# Patient Record
Sex: MALE | Race: WHITE | NOT HISPANIC OR LATINO | URBAN - METROPOLITAN AREA
[De-identification: names, ages, dates, MRNs, and addresses within clinical notes are randomized per-mention and may not be internally consistent; named-entity substitution may affect disease eponyms.]

---

## 2017-05-06 ENCOUNTER — HOSPITAL ENCOUNTER (EMERGENCY)
Facility: HOSPITAL | Age: 52
Discharge: HOME | End: 2017-05-07
Attending: EMERGENCY MEDICINE

## 2017-05-06 VITALS
BODY MASS INDEX: 26.66 KG/M2 | OXYGEN SATURATION: 96 % | SYSTOLIC BLOOD PRESSURE: 122 MMHG | HEIGHT: 69 IN | TEMPERATURE: 99 F | HEART RATE: 121 BPM | RESPIRATION RATE: 20 BRPM | WEIGHT: 180 LBS | DIASTOLIC BLOOD PRESSURE: 61 MMHG

## 2017-05-06 DIAGNOSIS — Z23 NEED FOR TETANUS BOOSTER: ICD-10-CM

## 2017-05-06 DIAGNOSIS — Z87.828: ICD-10-CM

## 2017-05-06 DIAGNOSIS — H11.33 SUBCONJUNCTIVAL HEMORRHAGE OF BOTH EYES: ICD-10-CM

## 2017-05-06 DIAGNOSIS — S09.90XA HEAD INJURY, INITIAL ENCOUNTER: ICD-10-CM
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
## 2017-05-06 DIAGNOSIS — S01.411A: Primary | ICD-10-CM

## 2017-05-06 PROCEDURE — 99283 EMERGENCY DEPT VISIT LOW MDM: CPT

## 2017-05-06 RX ORDER — LIDOCAINE HYDROCHLORIDE 10 MG/ML
10 INJECTION INFILTRATION; PERINEURAL
Status: DISCONTINUED | OUTPATIENT
Start: 2017-05-07 | End: 2017-05-07 | Stop reason: HOSPADM
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
## 2017-05-07 NOTE — ED NOTES
"Pt refusing all medical treatment and wants to leave. States he understands his conditions and "I will take care of it myself."  "
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
## 2017-05-07 NOTE — DISCHARGE INSTRUCTIONS
Black Eye     Wrap a thin towel around a cold pack before applying it to your eye.     A black eye is really a bruise around your eye. It is often caused by an injury to your face or head. It is not usually due to an injury to the eye itself. The swelling and black-and-blue color happen because of blood and fluids collecting in the skin around your eye. A black eye should return to normal in 1 or 2 weeks.  When to go to the emergency room (ER)  In many cases, a black eye is a minor injury. It can be treated with cold packs and pain medicine. But seek medical care right away if you have any of these symptoms:  · A change or loss of vision  · An eye that won't look in more than one direction  · Blood inside your eye, or bleeding from your nose or ears  · Fluid leaking from your eye  What to expect in the ER  · Your injury will be examined.  · Your vision, the way your eye moves, and the bones around your eye will be checked.  · You may have a fluorescein stain test. This uses dye and a special light to check for eye damage.  · An X-ray or other tests may be done.  · Depending on the results of your exam and tests, you may be referred to an eye specialist (ophthalmologist).  Follow-up  While your eye is healing, call your healthcare provider if you notice any of these symptoms:  · Swelling that doesn't improve after a few days  · Increased or severe pain  · Changes in your vision  · Warmth, redness, or pus near the bruise  To reduce pain and swelling from a black eye  · Apply ice packs every 20 minutes you're awake for the first 24 hours.  · Use warm compresses every 20 minutes for the next 24 hours.   Date Last Reviewed: 8/12/2015  © 4404-6985 [employer]. [address] [place] [address], [place] [address]. All rights reserved. This information is not intended as a substitute for professional medical care. Always follow your healthcare professional's instructions.          Face Laceration: Suture or  Tape  A laceration is a cut through the skin. This will require stitches if it is deep. Minor cuts may be treated with surgical tape.    Home care  · Your healthcare provider may prescribe an antibiotic. This is to help prevent infection. Follow all instructions for taking this medicine. Take the medicine every day until it is gone or you are told to stop. You should not have any left over.  · The healthcare provider may prescribe medicines for pain. Follow instructions for taking them.  · Follow the healthcare providers instructions on how to care for the cut.  · Wash your hands with soap and warm water before and after caring for the cut. This helps prevent infection.  · If a bandage was applied and it becomes wet or dirty, replace it. Otherwise, leave it in place for the first 24 hours, then change it once a day or as directed.  · If sutures were used, clean the wound daily:  ¨ After removing the bandage, wash the area with soap and water. Use a wet cotton swab to loosen and remove any blood or crust that forms.  ¨ After cleaning, keep the wound clean and dry. Talk with your doctor before applying any antibiotic ointment to the wound. Reapply a fresh bandage.  ¨ You may remove the bandage to shower as usual after the first 24 hours, but do not soak the area in water (no swimming) until the sutures are removed.  · If surgical tape was used, keep the area clean and dry. If it becomes wet, blot it dry with a towel.  · Most facial skin wounds heal without problems. However, an infection sometimes occurs despite proper treatment. Therefore, watch for the signs of infection listed below.  Follow-up care  Follow up with your healthcare provider as advised. Be sure to return for suture removal as directed. Ask your provider how long sutures should remain in place. If surgical tape closures were used, you may remove them yourself when your provider recommends if they have not fallen off on their own.  When to seek  medical advice  Call your healthcare provider right away if any of these occur:  · Wound bleeding not controlled by direct pressure  · Signs of infection, including increasing pain in the wound, increasing wound redness or swelling, or pus or bad odor coming from the wound  · Fever of 100.4°F (38ºC) or higher or as directed by your healthcare provider  · Stitches come apart or fall out or surgical tape falls off before 5 days  · Wound edges re-open  · Wound changes colors  · Numbness around the wound   Date Last Reviewed: 6/14/2015 © 2000-2016 [employer]. [address] [place] [address]. All rights reserved. This information is not intended as a substitute for professional medical care. Always follow your healthcare professional's instructions.          Treatment for Mild Traumatic Brain Injury (Concussion)  A mild traumatic brain injury (TBI) is a sudden jolt to your head that causes a temporary change in the way your brain works. It could be caused by a blow to your head, a blast, or a sudden and severe movement of your head that bounces your brain inside your skull. Falls, fights, sports, and car accidents also can cause TBIs.  Treatment of mild TBI   Having a mild TBI can change the way you feel, act, move, and think. Even though you may look fine, a mild TBI can have a big impact on many areas of your life. A mild TBI can cause headaches, fatigue, memory problems, mood swings, and inability to focus your thoughts.  Treatment for mild TBI may be different, depending on symptoms and other unrelated medical issues; therefore, no 2 TBIs are the same. You may need to work with a TBI team -- a group of healthcare providers who help people recover from TBI. For example, you might work with a physical therapist to help with your balance and movement problems. Or you might work with an occupational therapist to help you function better at home and at work. Other medical experts may help you  with emotional and thinking problems.  In some cases, your doctor may use medicine to ease symptoms while you recover. These may include pain relievers, antidepressants, antianxiety medicine, sleep aids, and muscle relaxants. Although medicines can help, they are not a main part of treatment. You should not take any medicines unless discussed and approved by your healthcare provider. Things that you can do for yourself are usually as important as the medicines you are prescribed. This part of your treatment is called self-management.  Self-management for mild TBI  Most people with mild TBI recover completely, but it may take weeks or months. For some people, symptoms may continue for years. Because of this, self-management may continue long after you leave the hospital. Many lifestyle changes that help your brain recover are good habits that you should keep up even after you have recovered. Here are some tips:    · Learn as much as you can about TBI. Share what you learn with friends and family.  · Let your health care team know about all your symptoms.  · Eat a healthy diet and drink plenty of fluids.  · Get plenty of sleep.  · Dont overexert yourself mentally or physically.  · Dont smoke, take drugs, or drink alcohol.  · Dont use caffeine or energy drinks as a way to make you feel less tired.  · Avoid activities that could cause another jolt to your head. Don't return to sports or any activity that could cause you to hit your head until all symptoms are gone and you have been cleared by your doctor. A second head injury before fully recovering from the first one can lead to serious brain injury. Ask your health care provider what types of activities are safe.  · Avoid mental stress. Learn some relaxation techniques like deep breathing.  · Keep a pad and pencil handy. Write things down if you are having trouble concentrating or remembering.  Let your healthcare provider know if your symptoms are getting worse.  And look out for other important mental symptoms. These include memory loss, having a hard time thinking clearly, having trouble controlling your emotions, and depression. Also be sure to report physical symptoms such as worsening headaches, loss of balance, changes in vision, seizures, and vomiting.  Recovery from a mild TBI takes time. Be patient and give your brain time to heal. Be sure to rely on your support system, which includes friends and family members who understand what you are going through. You might also want to join a support group and share your feelings with others who have had a TBI.    Date Last Reviewed: 8/17/2015  © 1722-8519 [employer]. [address] [place] [address], [place] [address]. All rights reserved. This information is not intended as a substitute for professional medical care. Always follow your healthcare professional's instructions.

## 2024-03-07 ENCOUNTER — HOSPITAL ENCOUNTER (OUTPATIENT)
Dept: GENERAL RADIOLOGY | Facility: HOSPITAL | Age: 59
Discharge: HOME | End: 2024-03-07
Payer: COMMERCIAL

## 2024-03-07 ENCOUNTER — OFFICE VISIT (OUTPATIENT)
Dept: INTERNAL MEDICINE | Facility: CLINIC | Age: 59
End: 2024-03-07
Payer: COMMERCIAL

## 2024-03-07 VITALS
OXYGEN SATURATION: 95 % | HEIGHT: 71 IN | HEART RATE: 88 BPM | BODY MASS INDEX: 44.1 KG/M2 | TEMPERATURE: 98 F | WEIGHT: 315 LBS | SYSTOLIC BLOOD PRESSURE: 152 MMHG | RESPIRATION RATE: 16 BRPM | DIASTOLIC BLOOD PRESSURE: 90 MMHG

## 2024-03-07 DIAGNOSIS — M25.561 CHRONIC PAIN OF BOTH KNEES: ICD-10-CM

## 2024-03-07 DIAGNOSIS — G89.29 CHRONIC PAIN OF BOTH KNEES: ICD-10-CM

## 2024-03-07 DIAGNOSIS — M25.562 CHRONIC PAIN OF BOTH KNEES: ICD-10-CM

## 2024-03-07 DIAGNOSIS — M17.0 PRIMARY OSTEOARTHRITIS OF BOTH KNEES: ICD-10-CM

## 2024-03-07 DIAGNOSIS — R06.02 SHORTNESS OF BREATH: ICD-10-CM

## 2024-03-07 DIAGNOSIS — I10 PRIMARY HYPERTENSION: Primary | ICD-10-CM

## 2024-03-07 DIAGNOSIS — K21.9 GASTROESOPHAGEAL REFLUX DISEASE WITHOUT ESOPHAGITIS: ICD-10-CM

## 2024-03-07 DIAGNOSIS — E29.1 HYPOGONADISM IN MALE: ICD-10-CM

## 2024-03-07 DIAGNOSIS — E66.01 MORBID OBESITY DUE TO EXCESS CALORIES: ICD-10-CM

## 2024-03-07 DIAGNOSIS — E55.9 VITAMIN D DEFICIENCY: ICD-10-CM

## 2024-03-07 DIAGNOSIS — Z87.898 HISTORY OF ALCOHOL USE DISORDER: ICD-10-CM

## 2024-03-07 DIAGNOSIS — Z12.2 SCREENING FOR LUNG CANCER: ICD-10-CM

## 2024-03-07 DIAGNOSIS — F17.201 TOBACCO DEPENDENCE IN REMISSION: ICD-10-CM

## 2024-03-07 PROCEDURE — 80053 COMPREHEN METABOLIC PANEL: CPT | Performed by: INTERNAL MEDICINE

## 2024-03-07 PROCEDURE — 84439 ASSAY OF FREE THYROXINE: CPT | Performed by: INTERNAL MEDICINE

## 2024-03-07 PROCEDURE — 73560 X-RAY EXAM OF KNEE 1 OR 2: CPT

## 2024-03-07 PROCEDURE — 84481 FREE ASSAY (FT-3): CPT | Performed by: INTERNAL MEDICINE

## 2024-03-07 PROCEDURE — 82306 VITAMIN D 25 HYDROXY: CPT | Performed by: INTERNAL MEDICINE

## 2024-03-07 PROCEDURE — 82607 VITAMIN B-12: CPT | Performed by: INTERNAL MEDICINE

## 2024-03-07 PROCEDURE — 83735 ASSAY OF MAGNESIUM: CPT | Performed by: INTERNAL MEDICINE

## 2024-03-07 PROCEDURE — 82746 ASSAY OF FOLIC ACID SERUM: CPT | Performed by: INTERNAL MEDICINE

## 2024-03-07 PROCEDURE — 85025 COMPLETE CBC W/AUTO DIFF WBC: CPT | Performed by: INTERNAL MEDICINE

## 2024-03-07 PROCEDURE — 80061 LIPID PANEL: CPT | Performed by: INTERNAL MEDICINE

## 2024-03-07 PROCEDURE — 71046 X-RAY EXAM CHEST 2 VIEWS: CPT

## 2024-03-07 PROCEDURE — 84443 ASSAY THYROID STIM HORMONE: CPT | Performed by: INTERNAL MEDICINE

## 2024-03-07 RX ORDER — TRIAMTERENE AND HYDROCHLOROTHIAZIDE 37.5; 25 MG/1; MG/1
1 TABLET ORAL DAILY
Qty: 30 TABLET | Refills: 2 | Status: SHIPPED | OUTPATIENT
Start: 2024-03-07

## 2024-03-07 RX ORDER — TESTOSTERONE CYPIONATE 200 MG/ML
VIAL (ML) INTRAMUSCULAR
COMMUNITY
Start: 2024-01-04

## 2024-03-07 RX ORDER — ESCITALOPRAM OXALATE 20 MG/1
TABLET ORAL
COMMUNITY

## 2024-03-07 RX ORDER — TRAZODONE HYDROCHLORIDE 100 MG/1
TABLET ORAL
COMMUNITY

## 2024-03-07 RX ORDER — TIOTROPIUM BROMIDE INHALATION SPRAY 3.12 UG/1
2 SPRAY, METERED RESPIRATORY (INHALATION)
Qty: 4 G | Refills: 2 | Status: SHIPPED | OUTPATIENT
Start: 2024-03-07

## 2024-03-07 NOTE — PATIENT INSTRUCTIONS
Do labs today   Xrays of knees and CXR  Do pfts  And low dose Ct chest  Refer to Ortho-Dr Tracy-for further evaluation of knee pain  Refer to endocrinologist for evaluation of hypogonadism and continuation of meds  Cut down portions  Monitor BP 2 x/day - 3-4 x/week and record goal is blood pressure less than 130/80  Start triamterene hydrochlorothiazide 1 tablet daily for blood pressure control  Follow-up in 3 weeks to check blood pressure and labs

## 2024-03-07 NOTE — ASSESSMENT & PLAN NOTE
Probable COPD versus other pathology-needs further evaluation wife is still smoking patient stopped smoking 9 years ago and used to smoke 1/2 to 1 pack/day for 20 years.    Plan do routine chest x-ray and needs low-dose CT of the chest to rule out lung cancer  Patient given sample of Symbicort Respimat 2 puffs daily and prescription for this  Also needs PFTs

## 2024-03-07 NOTE — ASSESSMENT & PLAN NOTE
Continue with PPI once daily advised to cut down on chronic use of ibuprofen-already using diclofenac gel as needed  Ortho referral initiated  Steroid shots did not help in the past  Check labs CBC kidney function

## 2024-03-07 NOTE — PROGRESS NOTES
CHIEF COMPLAINT  Kevin Jackson presents to [facility] to Bilateral knee pain (Right knee is worse. Asking for an injection. ) and Shortness of Breath (Steadily getting worse. Cough-white, thick mucous. Nose drainage-no color. No sore throat. No headache. )     HPI  58-year-old patient here to establish care-previous PCP was Dr Luevano- in [facility].    Main complaint is shortness of breath is getting worse-295 lbs to 300 lbs past year    On testosterone shots for several years-5-6 years    Knee pain worse- was getting knee injections every 3 months- for past year--using ibuprofen 200 mg x 12 tablets if very painful-usually takes 800 mg daily of ibuprofen-rates pain at 5-6/10    Past medical history significant for GERD, anxiety, tobacco dependence, obesity    SH--1/2-1PPD  for 20 yrs-quit 9 yrs ago-wife still smoking-  used to be a big beer drinker 7-8 cans beer 10 months ago for 15 yrs-now 3-4 beers every 2 months   for 20 yrs and [employer] prior-retired in 2022      Past History:  Allergies: Patient has no known allergies.   Medical History: has a past medical history of GERD (gastroesophageal reflux disease).   Surgical History: has no past surgical history on file.   Family History: family history is not on file.   Social History: reports that he quit smoking about 9 years ago. His smoking use included cigarettes. He started smoking about 28 years ago. He has a 9.9 pack-year smoking history. He has never used smokeless tobacco.  Social History     Social History Narrative    Not on file         Current Outpatient Medications:     escitalopram (LEXAPRO) 20 MG tablet, , Disp: , Rfl:     pantoprazole (PROTONIX) 40 mg in 100mL NS IVPB, , Disp: , Rfl:     Testosterone Cypionate 200 MG/ML solution, 1 ML Intramuscular [id number] for 30 days, Disp: , Rfl:     traZODone (DESYREL) 100 MG tablet, , Disp: , Rfl:     tiotropium bromide monohydrate (Spiriva  "Respimat) 2.5 MCG/ACT aerosol solution inhaler, Inhale 2 puffs Daily., Disp: 4 g, Rfl: 2    triamterene-hydrochlorothiazide (Maxzide-25) 37.5-25 MG per tablet, Take 1 tablet by mouth Daily., Disp: 30 tablet, Rfl: 2     OBJECTIVE  Vital Signs  Vitals:    03/07/24 1305 03/07/24 1349   BP: (!) 170/110 152/90   BP Location: Left arm Left arm   Patient Position: Sitting Sitting   Cuff Size: Large Adult Large Adult   Pulse: 88    Resp: 16    Temp: 98 °F (36.7 °C)    TempSrc: Temporal    SpO2: 95%    Weight: (!) 144 kg (318 lb)    Height: 180.3 cm (71\")       Body mass index is 44.35 kg/m².      Physical Exam  Vitals and nursing note reviewed.   Constitutional:       Appearance: Normal appearance. He is obese.   HENT:      Head: Normocephalic and atraumatic.      Nose: Nose normal.   Eyes:      Extraocular Movements: Extraocular movements intact.      Pupils: Pupils are equal, round, and reactive to light.   Cardiovascular:      Rate and Rhythm: Normal rate and regular rhythm.   Pulmonary:      Effort: Pulmonary effort is normal.      Breath sounds: Normal breath sounds.   Abdominal:      General: Abdomen is flat.      Palpations: Abdomen is soft.   Musculoskeletal:      Cervical back: Normal range of motion and neck supple.   Skin:     General: Skin is warm and dry.   Neurological:      General: No focal deficit present.      Mental Status: He is alert and oriented to person, place, and time.   Psychiatric:         Mood and Affect: Mood normal.         Behavior: Behavior normal.         RESULTS REVIEW  No results found for: \"PROBNP\", \"BNP\"          No results found for: \"TSH\"   No results found for: \"FREET4\"         No Images in the past 120 days found..              ASSESSMENT & PLAN  Diagnoses and all orders for this visit:    1. Primary hypertension (Primary)  Assessment & Plan:  Not controlled patient has had weight gain of more than 9 pounds since last fall 2023 and also with worsening knee pain  Blood pressure at " clinic initially was 170/110 and on my exam down to 152/90    Plan discussed hypertension and complications patient is not on any medications-discussed that this is a silent killer trying to avoid strokes heart attack peripheral vascular disease and kidney problems among others.  Start Maxide 37 point 5-25 1 tablet daily  Follow a low-salt diet  Cut back on portions    Orders:  -     Comprehensive Metabolic Panel; Future  -     Lipid Panel; Future  -     TSH+Free T4; Future  -     T3, Free; Future  -     Vitamin B12; Future  -     Folate; Future  -     Magnesium; Future  -     Vitamin D,25-Hydroxy; Future  -     CBC & Differential; Future  -     MicroAlbumin, Urine, Random - Urine, Clean Catch; Future  -     triamterene-hydrochlorothiazide (Maxzide-25) 37.5-25 MG per tablet; Take 1 tablet by mouth Daily.  Dispense: 30 tablet; Refill: 2  -     CBC & Differential  -     Vitamin D,25-Hydroxy  -     Magnesium  -     Folate  -     Vitamin B12  -     T3, Free  -     TSH+Free T4  -     Lipid Panel  -     Comprehensive Metabolic Panel    2. Shortness of breath  Comments:  probable COPD-do CXR-inhaler -PFTS-wife still smoking  Assessment & Plan:  Probable COPD versus other pathology-needs further evaluation wife is still smoking patient stopped smoking 9 years ago and used to smoke 1/2 to 1 pack/day for 20 years.    Plan do routine chest x-ray and needs low-dose CT of the chest to rule out lung cancer  Patient given sample of Symbicort Respimat 2 puffs daily and prescription for this  Also needs PFTs    Orders:  -     XR Chest PA & Lateral; Future  -     tiotropium bromide monohydrate (Spiriva Respimat) 2.5 MCG/ACT aerosol solution inhaler; Inhale 2 puffs Daily.  Dispense: 4 g; Refill: 2  -     Cancel: Complete PFT - Pre & Post Bronchodilator; Future  -     Complete PFT - Pre & Post Bronchodilator; Future    3. Chronic pain of both knees  -     Comprehensive Metabolic Panel; Future  -     Lipid Panel; Future  -     TSH+Free  T4; Future  -     T3, Free; Future  -     Vitamin B12; Future  -     Folate; Future  -     Magnesium; Future  -     Vitamin D,25-Hydroxy; Future  -     CBC & Differential; Future  -     MicroAlbumin, Urine, Random - Urine, Clean Catch; Future  -     XR Knee 3 View Right; Future  -     XR Knee 1 or 2 View Left; Future  -     Ambulatory Referral to Orthopedic Surgery  -     CBC & Differential  -     Vitamin D,25-Hydroxy  -     Magnesium  -     Folate  -     Vitamin B12  -     T3, Free  -     TSH+Free T4  -     Lipid Panel  -     Comprehensive Metabolic Panel    4. Primary osteoarthritis of both knees  -     Ambulatory Referral to Orthopedic Surgery    5. Hypogonadism in male  Comments:  getting testosterone snots every every 2 weeks for past 5-6 yrs-by provider Dr Luevano  Overview:  getting testosterone snots every every 2 weeks for past 5-6 yrs-by provider Dr Luevano    Orders:  -     Ambulatory Referral to Endocrinology    6. Gastroesophageal reflux disease without esophagitis  Assessment & Plan:  Continue with PPI once daily advised to cut down on chronic use of ibuprofen-already using diclofenac gel as needed  Ortho referral initiated  Steroid shots did not help in the past  Check labs CBC kidney function    Orders:  -     Comprehensive Metabolic Panel; Future  -     Lipid Panel; Future  -     TSH+Free T4; Future  -     T3, Free; Future  -     Vitamin B12; Future  -     Folate; Future  -     Magnesium; Future  -     Vitamin D,25-Hydroxy; Future  -     CBC & Differential; Future  -     MicroAlbumin, Urine, Random - Urine, Clean Catch; Future  -     CBC & Differential  -     Vitamin D,25-Hydroxy  -     Magnesium  -     Folate  -     Vitamin B12  -     T3, Free  -     TSH+Free T4  -     Lipid Panel  -     Comprehensive Metabolic Panel    7. Vitamin D deficiency  -     Comprehensive Metabolic Panel; Future  -     Lipid Panel; Future  -     TSH+Free T4; Future  -     T3, Free; Future  -     Vitamin B12; Future  -      Folate; Future  -     Magnesium; Future  -     Vitamin D,25-Hydroxy; Future  -     CBC & Differential; Future  -     MicroAlbumin, Urine, Random - Urine, Clean Catch; Future  -     CBC & Differential  -     Vitamin D,25-Hydroxy  -     Magnesium  -     Folate  -     Vitamin B12  -     T3, Free  -     TSH+Free T4  -     Lipid Panel  -     Comprehensive Metabolic Panel    8. Tobacco dependence in remission  -      CT Chest Low Dose Cancer Screening WO; Future  -     tiotropium bromide monohydrate (Spiriva Respimat) 2.5 MCG/ACT aerosol solution inhaler; Inhale 2 puffs Daily.  Dispense: 4 g; Refill: 2  -     Cancel: Complete PFT - Pre & Post Bronchodilator; Future  -     Complete PFT - Pre & Post Bronchodilator; Future    9. Screening for lung cancer  -      CT Chest Low Dose Cancer Screening WO; Future    10. Morbid obesity due to excess calories  -     Comprehensive Metabolic Panel; Future  -     Lipid Panel; Future  -     TSH+Free T4; Future  -     T3, Free; Future  -     Vitamin B12; Future  -     Folate; Future  -     Magnesium; Future  -     Vitamin D,25-Hydroxy; Future  -     CBC & Differential; Future  -     MicroAlbumin, Urine, Random - Urine, Clean Catch; Future  -     Ambulatory Referral to Orthopedic Surgery  -     Cancel: Complete PFT - Pre & Post Bronchodilator; Future  -     CBC & Differential  -     Vitamin D,25-Hydroxy  -     Magnesium  -     Folate  -     Vitamin B12  -     T3, Free  -     TSH+Free T4  -     Lipid Panel  -     Comprehensive Metabolic Panel  -     Complete PFT - Pre & Post Bronchodilator; Future    11. History of alcohol use disorder  Comments:  used to be a big beer drinker 7-8 cans beer for 15 yrs  10 months ago-in 2022--now 3-4 beers every 2 months         BMI cannot be calculated due to outdated height or weight values.  Please input a current height/weight in Vitals and re-renter [id number] in Note to pull in correct documentation based on BMI range.       Patient Instructions   Do  labs today   Xrays of knees and CXR  Do pfts  And low dose Ct chest  Refer to Ortho-Dr Tracy-for further evaluation of knee pain  Refer to endocrinologist for evaluation of hypogonadism and continuation of meds  Cut down portions  Monitor BP 2 x/day - 3-4 x/week and record goal is blood pressure less than 130/80  Start triamterene hydrochlorothiazide 1 tablet daily for blood pressure control  Follow-up in 3 weeks to check blood pressure and labs       FOLLOW UP  Return in about 3 weeks (around 3/28/2024), or if symptoms worsen or fail to improve, for Annual physical, Recheck.    Patient was given instructions and counseling regarding his condition or for health maintenance advice. Please see specific information pulled into the AVS if appropriate.

## 2024-03-07 NOTE — ASSESSMENT & PLAN NOTE
Not controlled patient has had weight gain of more than 9 pounds since last fall 2023 and also with worsening knee pain  Blood pressure at clinic initially was 170/110 and on my exam down to 152/90    Plan discussed hypertension and complications patient is not on any medications-discussed that this is a silent killer trying to avoid strokes heart attack peripheral vascular disease and kidney problems among others.  Start Maxide 37 point 5-25 1 tablet daily  Follow a low-salt diet  Cut back on portions

## 2024-03-08 ENCOUNTER — TELEPHONE (OUTPATIENT)
Dept: INTERNAL MEDICINE | Facility: CLINIC | Age: 59
End: 2024-03-08
Payer: COMMERCIAL

## 2024-03-08 LAB
25(OH)D3 SERPL-MCNC: 35.5 NG/ML (ref 30–100)
ALBUMIN SERPL-MCNC: 4.3 G/DL (ref 3.5–5.2)
ALBUMIN/GLOB SERPL: 1.5 G/DL
ALP SERPL-CCNC: 66 U/L (ref 39–117)
ALT SERPL W P-5'-P-CCNC: 25 U/L (ref 1–41)
ANION GAP SERPL CALCULATED.3IONS-SCNC: 12.5 MMOL/L (ref 5–15)
AST SERPL-CCNC: 26 U/L (ref 1–40)
BASOPHILS # BLD AUTO: 0.06 10*3/MM3 (ref 0–0.2)
BASOPHILS NFR BLD AUTO: 0.7 % (ref 0–1.5)
BILIRUB SERPL-MCNC: 0.5 MG/DL (ref 0–1.2)
BUN SERPL-MCNC: 12 MG/DL (ref 6–20)
BUN/CREAT SERPL: 11.9 (ref 7–25)
CALCIUM SPEC-SCNC: 9.1 MG/DL (ref 8.6–10.5)
CHLORIDE SERPL-SCNC: 101 MMOL/L (ref 98–107)
CHOLEST SERPL-MCNC: 212 MG/DL (ref 0–200)
CO2 SERPL-SCNC: 22.5 MMOL/L (ref 22–29)
CREAT SERPL-MCNC: 1.01 MG/DL (ref 0.76–1.27)
DEPRECATED RDW RBC AUTO: 40.7 FL (ref 37–54)
EGFRCR SERPLBLD CKD-EPI 2021: 87.3 ML/MIN/1.73
EOSINOPHIL # BLD AUTO: 0.1 10*3/MM3 (ref 0–0.4)
EOSINOPHIL NFR BLD AUTO: 1.2 % (ref 0.3–6.2)
ERYTHROCYTE [DISTWIDTH] IN BLOOD BY AUTOMATED COUNT: 13.4 % (ref 12.3–15.4)
FOLATE SERPL-MCNC: 10.4 NG/ML (ref 4.78–24.2)
GLOBULIN UR ELPH-MCNC: 2.9 GM/DL
GLUCOSE SERPL-MCNC: 83 MG/DL (ref 65–99)
HCT VFR BLD AUTO: 47 % (ref 37.5–51)
HDLC SERPL-MCNC: 35 MG/DL (ref 40–60)
HGB BLD-MCNC: 15.3 G/DL (ref 13–17.7)
IMM GRANULOCYTES # BLD AUTO: 0.04 10*3/MM3 (ref 0–0.05)
IMM GRANULOCYTES NFR BLD AUTO: 0.5 % (ref 0–0.5)
LDLC SERPL CALC-MCNC: 155 MG/DL (ref 0–100)
LDLC/HDLC SERPL: 4.35 {RATIO}
LYMPHOCYTES # BLD AUTO: 1.66 10*3/MM3 (ref 0.7–3.1)
LYMPHOCYTES NFR BLD AUTO: 20.1 % (ref 19.6–45.3)
MAGNESIUM SERPL-MCNC: 2 MG/DL (ref 1.6–2.6)
MCH RBC QN AUTO: 27.5 PG (ref 26.6–33)
MCHC RBC AUTO-ENTMCNC: 32.6 G/DL (ref 31.5–35.7)
MCV RBC AUTO: 84.4 FL (ref 79–97)
MONOCYTES # BLD AUTO: 0.5 10*3/MM3 (ref 0.1–0.9)
MONOCYTES NFR BLD AUTO: 6.1 % (ref 5–12)
NEUTROPHILS NFR BLD AUTO: 5.89 10*3/MM3 (ref 1.7–7)
NEUTROPHILS NFR BLD AUTO: 71.4 % (ref 42.7–76)
NRBC BLD AUTO-RTO: 0 /100 WBC (ref 0–0.2)
PLATELET # BLD AUTO: 222 10*3/MM3 (ref 140–450)
PMV BLD AUTO: 10.8 FL (ref 6–12)
POTASSIUM SERPL-SCNC: 3.8 MMOL/L (ref 3.5–5.2)
PROT SERPL-MCNC: 7.2 G/DL (ref 6–8.5)
RBC # BLD AUTO: 5.57 10*6/MM3 (ref 4.14–5.8)
SODIUM SERPL-SCNC: 136 MMOL/L (ref 136–145)
T3FREE SERPL-MCNC: 3.59 PG/ML (ref 2–4.4)
T4 FREE SERPL-MCNC: 1.01 NG/DL (ref 0.93–1.7)
TRIGL SERPL-MCNC: 123 MG/DL (ref 0–150)
TSH SERPL DL<=0.05 MIU/L-ACNC: 2.09 UIU/ML (ref 0.27–4.2)
VIT B12 BLD-MCNC: 864 PG/ML (ref 211–946)
VLDLC SERPL-MCNC: 22 MG/DL (ref 5–40)
WBC NRBC COR # BLD AUTO: 8.25 10*3/MM3 (ref 3.4–10.8)

## 2024-03-08 NOTE — TELEPHONE ENCOUNTER
----- Message from Karen Rios MD sent at 3/7/2024  4:29 PM EST -----  Please call the patient regarding his chest x-ray.  Please inform patient that everything was normal no mass or other abnormalities of concern

## 2024-03-08 NOTE — TELEPHONE ENCOUNTER
Name: Kevin Jackson    Relationship: Self    Best Callback Number: [phone]    HUB PROVIDED THE RELAY MESSAGE FROM THE OFFICE   PATIENT VOICED UNDERSTANDING AND HAS NO FURTHER QUESTIONS AT THIS TIME

## 2024-03-08 NOTE — TELEPHONE ENCOUNTER
Called, and no one answered, left a voicemail for them to call the office back.     Hub relay: Please inform patient that everything was normal no mass or other abnormalities of concern

## 2024-03-19 ENCOUNTER — OFFICE VISIT (OUTPATIENT)
Dept: ORTHOPEDIC SURGERY | Facility: CLINIC | Age: 59
End: 2024-03-19
Payer: COMMERCIAL

## 2024-03-19 VITALS
SYSTOLIC BLOOD PRESSURE: 167 MMHG | HEIGHT: 71 IN | HEART RATE: 71 BPM | WEIGHT: 315 LBS | OXYGEN SATURATION: 97 % | DIASTOLIC BLOOD PRESSURE: 91 MMHG | BODY MASS INDEX: 44.1 KG/M2

## 2024-03-19 DIAGNOSIS — E66.01 OBESITY, MORBID, BMI 40.0-49.9: ICD-10-CM

## 2024-03-19 DIAGNOSIS — M17.0 BILATERAL PRIMARY OSTEOARTHRITIS OF KNEE: Primary | ICD-10-CM

## 2024-03-19 PROCEDURE — 99203 OFFICE O/P NEW LOW 30 MIN: CPT | Performed by: ORTHOPAEDIC SURGERY

## 2024-03-19 RX ORDER — IBUPROFEN 400 MG/1
400 TABLET ORAL EVERY 6 HOURS PRN
COMMUNITY

## 2024-03-19 NOTE — PROGRESS NOTES
"Chief Complaint  Initial Evaluation of the Left Knee and Initial Evaluation of the Right Knee     Subjective      Kevin Jackson presents to [facility] for evaluation of the bilateral knees. The patient reports bilateral knee pain. He denies a specific injury. He reports his right is worse than the left. He reports pain for a couple years. He is retired. He admits to pain and stiffness. He has had steroid injections by his PCP that no longer give him relief. He uses Voltaren gel and takes ibuprofen.     No Known Allergies     Social History     Socioeconomic History    Marital status:    Tobacco Use    Smoking status: Former     Current packs/day: 0.00     Average packs/day: 0.5 packs/day for 19.8 years (9.9 ttl pk-yrs)     Types: Cigarettes     Start date: 1995     Quit date: 3/1/2015     Years since quittin.0    Smokeless tobacco: Never        I reviewed the patient's chief complaint, history of present illness, review of systems, past medical history, surgical history, family history, social history, medications, and allergy list.     Review of Systems     Constitutional: Denies fevers, chills, weight loss  Cardiovascular: Denies chest pain, shortness of breath  Skin: Denies rashes, acute skin changes  Neurologic: Denies headache, loss of consciousness  MSK: bilateral knee pain      Vital Signs:   /91   Pulse 71   Ht 180.3 cm (71\")   Wt (!) 144 kg (318 lb)   SpO2 97%   BMI 44.35 kg/m²          Physical Exam  General: Alert. No acute distress    Ortho Exam      Right knee- Positive EHL, FHL, GS and TA. Sensation intact to all 5 nerves of the foot. Positive pulses. Non-tender. ROM -3 to 120 degrees. Stable to varus/valgus stress. Stable to anterior/posterior drawer. No effusion. Mild swelling. Negative Lachman's. Negative Canelo's. Positive crepitus.     Left knee- Positive EHL, FHL, GS and TA. Sensation intact to all 5 nerves of the foot. Positive pulses. " Non-tender. ROM 0 to 120 degrees. Stable to varus/valgus stress. Stable to anterior/posterior drawer. No effusion. Mild swelling. Negative Lachman's. Negative Canelo's. Positive crepitus.    Procedures      Imaging Results (Most Recent)       None             Result Review :       XR Knee 1 or 2 View Left    Result Date: 3/7/2024  Narrative: PROCEDURE: XR KNEE 1 OR 2 VW LEFT  COMPARISON: None  INDICATIONS: knee pain  FINDINGS: There is no evidence for displaced fracture or dislocation. No definitive joint effusion is observed.  Moderate tricompartmental osteoarthritic degenerative changes are observed. These findings are most pronounced within the medial compartment. No focal osseous abnormalities are seen. The soft tissues are unremarkable.      Impression:  No evidence for displaced fracture or dislocation.  Moderate tricompartmental osteoarthritic degenerative changes of the knee.      ENDER BETANCUR MD       Electronically Signed and Approved By: ENDER BETANCUR MD on 3/07/2024 at 16:40             XR Knee 1 or 2 View Right    Result Date: 3/7/2024  Narrative: PROCEDURE: XR KNEE 1 OR 2 VW RIGHT  COMPARISON: None  INDICATIONS: knee pain  FINDINGS: There is no evidence for displaced fracture or dislocation. No definitive joint effusion is observed.  Moderate tricompartmental osteoarthritic degenerative changes are observed. These findings are most pronounced within the patellofemoral compartment.  A curvilinear density is noted in the region of the anterior tibial tuberosity.  This finding may relate to postoperative changes.  A retained foreign body could have this appearance.  The soft tissues are unremarkable.      Impression:  No evidence for displaced fracture or dislocation.  Moderate tricompartmental osteoarthritic degenerative changes of the knee.      ENDER BETANCUR MD       Electronically Signed and Approved By: ENDER BETANCUR MD on 3/07/2024 at 16:39             XR Chest PA & Lateral    Result Date:  3/7/2024  Narrative: PROCEDURE: XR CHEST PA AND LATERAL  COMPARISON: None  INDICATIONS: dyspnea/tobacco use  FINDINGS:  Heart size and pulmonary vasculature within normal limits.  Hilar and mediastinal contours normal.  Lungs clear other than calcified granuloma on the right.  Costophrenic angles sharp      Impression:  No active cardiopulmonary disease     ANUJ CARRILLO MD       Electronically Signed and Approved By: ANUJ CARRILLO MD on 3/07/2024 at 15:44                     Assessment and Plan     Diagnoses and all orders for this visit:    1. Bilateral primary osteoarthritis of knee (Primary)    2. Obesity, morbid, BMI 40.0-49.9        Discussed the treatment plan with the patient.  I reviewed the previous x-rays with the patient. Plan for conservative treatment at this time. Home exercises reviewed. He has failed to get relief with steroid injections and medications. He has modified his activity. Plan to seek approval for Visco injections.     Educated on risk of elevated BMI.  Discussed options for weight loss/decreasing BMI prior to procedure including dietician consult, weight loss options and exercise program. and Call or return if worsening symptoms.    Follow Up     For Visco injection      Patient was given instructions and counseling regarding his condition or for health maintenance advice. Please see specific information pulled into the AVS if appropriate.     Scribed for Ruperto Barrios MD by Ann Varma.  03/19/24   08:51 EDT    I have personally performed the services described in this document as scribed by the above individual and it is both accurate and complete. Ruperto Barrios MD 03/19/24

## 2024-03-22 ENCOUNTER — PATIENT ROUNDING (BHMG ONLY) (OUTPATIENT)
Dept: INTERNAL MEDICINE | Age: 59
End: 2024-03-22
Payer: COMMERCIAL

## 2024-03-28 ENCOUNTER — OFFICE VISIT (OUTPATIENT)
Dept: INTERNAL MEDICINE | Age: 59
End: 2024-03-28
Payer: COMMERCIAL

## 2024-03-28 VITALS
DIASTOLIC BLOOD PRESSURE: 98 MMHG | BODY MASS INDEX: 44.1 KG/M2 | TEMPERATURE: 98.6 F | HEIGHT: 71 IN | WEIGHT: 315 LBS | SYSTOLIC BLOOD PRESSURE: 150 MMHG | HEART RATE: 68 BPM | OXYGEN SATURATION: 95 %

## 2024-03-28 DIAGNOSIS — E55.9 VITAMIN D DEFICIENCY: ICD-10-CM

## 2024-03-28 DIAGNOSIS — Z00.00 ROUTINE HISTORY AND PHYSICAL EXAMINATION OF ADULT: Primary | ICD-10-CM

## 2024-03-28 DIAGNOSIS — I10 PRIMARY HYPERTENSION: ICD-10-CM

## 2024-03-28 DIAGNOSIS — M17.0 PRIMARY OSTEOARTHRITIS OF BOTH KNEES: ICD-10-CM

## 2024-03-28 DIAGNOSIS — M25.521 PAIN OF BOTH ELBOWS: ICD-10-CM

## 2024-03-28 DIAGNOSIS — F17.201 TOBACCO DEPENDENCE IN REMISSION: ICD-10-CM

## 2024-03-28 DIAGNOSIS — E29.1 HYPOGONADISM IN MALE: ICD-10-CM

## 2024-03-28 DIAGNOSIS — R14.0 BLOATING: ICD-10-CM

## 2024-03-28 DIAGNOSIS — M25.522 PAIN OF BOTH ELBOWS: ICD-10-CM

## 2024-03-28 DIAGNOSIS — Z12.11 SCREEN FOR COLON CANCER: ICD-10-CM

## 2024-03-28 DIAGNOSIS — E66.01 MORBID OBESITY WITH BMI OF 40.0-44.9, ADULT: ICD-10-CM

## 2024-03-28 DIAGNOSIS — E78.00 PURE HYPERCHOLESTEROLEMIA: ICD-10-CM

## 2024-03-28 RX ORDER — AMLODIPINE BESYLATE 5 MG/1
5 TABLET ORAL DAILY
Qty: 30 TABLET | Refills: 2 | Status: SHIPPED | OUTPATIENT
Start: 2024-03-28

## 2024-03-28 RX ORDER — ATORVASTATIN CALCIUM 20 MG/1
20 TABLET, FILM COATED ORAL DAILY
Qty: 30 TABLET | Refills: 2 | Status: SHIPPED | OUTPATIENT
Start: 2024-03-28

## 2024-03-28 RX ORDER — SEMAGLUTIDE 0.5 MG/.5ML
0.5 INJECTION, SOLUTION SUBCUTANEOUS WEEKLY
Qty: 2 ML | Refills: 1 | Status: SHIPPED | OUTPATIENT
Start: 2024-04-28

## 2024-03-28 RX ORDER — SIMETHICONE 80 MG
80 TABLET,CHEWABLE ORAL EVERY 6 HOURS PRN
Qty: 90 TABLET | Refills: 0 | Status: SHIPPED | OUTPATIENT
Start: 2024-03-28

## 2024-03-28 RX ORDER — SEMAGLUTIDE 0.25 MG/.5ML
0.25 INJECTION, SOLUTION SUBCUTANEOUS WEEKLY
Qty: 2 ML | Refills: 0 | Status: SHIPPED | OUTPATIENT
Start: 2024-03-28

## 2024-03-28 NOTE — PROGRESS NOTES
CHIEF COMPLAINT  Kevin Jackson presents to [facility] for follow-up of Annual Exam (Pt is a 58 yr old male presenting to Internal Medicine for an annual physical; Pt reports an increase in joint pain in elbows; pt states he's noticed after sitting for a while, his joints stiffen up/) and Heartburn (Pt reports he's currently taking medication therapy for heartburn, gas, and swelling but he still has the full, tight, gassy feeling in abdomin after a small meal, pt reports discomfort is so bad it gets hard to breath at times after meals).    HPI    59 yo pt here for Physical and f/u of BP, referrals, labs    Records reviewed, meds reviewed in detail, labs reviewed with patient.  Plan of care is as follows below.    Main concern elbow pain -arthritic- mild relief with ibuprofen and feels swollen up after eating-for past 6 months    Chol- BMZ=422, HDL=35, sldu=779    HTN-taking meds-elevated-BP at home 149-150/90s    Hypogonadism-on chronic shots    Knee xrays-mod OA-saw Ortho Dr Barrios 3/19/24-for Visco injections  Pain is at-3-4/10    CXR-NAD     Patient Instructions 3/7/24   Do labs today   Xrays of knees and CXR  Do pfts  And low dose Ct chest  Refer to Ortho-Dr Tracy-for further evaluation of knee pain  Refer to endocrinologist for evaluation of hypogonadism and continuation of meds  Cut down portions  Monitor BP 2 x/day - 3-4 x/week and record goal is blood pressure less than 130/80  Start triamterene hydrochlorothiazide 1 tablet daily for blood pressure control  Follow-up in 3 weeks to check blood pressure and labs    3/7/24 visit  patient here to establish care-previous PCP was Dr Luevano- in [facility].     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          Current Outpatient Medications:     escitalopram (LEXAPRO) 20 MG tablet, , Disp: , Rfl:     ibuprofen (ADVIL,MOTRIN) 400 MG tablet, Take 1 tablet by mouth Every 6 (Six) Hours As Needed for Mild Pain., Disp: , Rfl:     pantoprazole (PROTONIX) 40 mg in 100mL NS IVPB, , Disp: , Rfl:     Testosterone Cypionate 200 MG/ML solution, 1 ML Intramuscular [id number] for 30 days, Disp: , Rfl:     tiotropium bromide monohydrate (Spiriva Respimat) 2.5 MCG/ACT aerosol solution inhaler, Inhale 2 puffs Daily., Disp: 4 g, Rfl: 2    traZODone (DESYREL) 100 MG tablet, , Disp: , Rfl:     triamterene-hydrochlorothiazide (Maxzide-25) 37.5-25 MG per tablet, Take 1 tablet by mouth Daily., Disp: 30 tablet, Rfl: 2    amLODIPine (NORVASC) 5 MG tablet, Take 1 tablet by mouth Daily., Disp: 30 tablet, Rfl: 2    atorvastatin (LIPITOR) 20 MG tablet, Take 1 tablet by mouth Daily., Disp: 30 tablet, Rfl: 2    Semaglutide-Weight Management (Wegovy) 0.25 MG/0.5ML solution auto-injector, Inject 0.5 mL under the skin into the appropriate area as directed 1 (One) Time Per Week. For 4 weeks, Disp: 2 mL, Rfl: 0    [START ON 4/28/2024] Semaglutide-Weight Management (Wegovy) 0.5 MG/0.5ML solution auto-injector, Inject 0.5 mL under the skin into the appropriate area as directed 1 (One) Time Per Week. For 4 weeks, Disp: 2 mL, Rfl: 1    simethicone (Gas-X) 80 MG chewable tablet, Chew 1 tablet Every 6 (Six) Hours As Needed for Flatulence., Disp: 90 tablet, Rfl: 0   PFS reviewed.      OBJECTIVE  Vital Signs  Vitals:    03/28/24 0828 03/28/24 0907 03/28/24 0939   BP: 157/95  "168/93 150/98   BP Location: Right arm Right arm Left arm   Patient Position: Sitting Sitting Sitting   Cuff Size: Large Adult Large Adult Large Adult   Pulse: 68     Temp: 98.6 °F (37 °C)     TempSrc: Infrared     SpO2: 95%     Weight: (!) 144 kg (318 lb)     Height: 180.3 cm (70.98\")        Body mass index is 44.37 kg/m².295 one yr ago IBW-200 lbs    Physical Exam  Vitals and nursing note reviewed.   Constitutional:       Appearance: Normal appearance.   HENT:      Head: Normocephalic and atraumatic.      Nose: Nose normal.   Eyes:      Extraocular Movements: Extraocular movements intact.      Pupils: Pupils are equal, round, and reactive to light.   Cardiovascular:      Rate and Rhythm: Normal rate and regular rhythm.   Pulmonary:      Effort: Pulmonary effort is normal.      Breath sounds: Normal breath sounds.   Abdominal:      General: Abdomen is flat.      Palpations: Abdomen is soft.   Musculoskeletal:      Cervical back: Normal range of motion and neck supple.   Skin:     General: Skin is warm and dry.   Neurological:      General: No focal deficit present.      Mental Status: He is alert and oriented to person, place, and time.   Psychiatric:         Mood and Affect: Mood normal.         Behavior: Behavior normal.          RESULTS REVIEW  No results found for: \"PROBNP\", \"BNP\"  CMP          3/7/2024    14:21   CMP   Glucose 83    BUN 12    Creatinine 1.01    EGFR 87.3    Sodium 136    Potassium 3.8    Chloride 101    Calcium 9.1    Total Protein 7.2    Albumin 4.3    Globulin 2.9    Total Bilirubin 0.5    Alkaline Phosphatase 66    AST (SGOT) 26    ALT (SGPT) 25    Albumin/Globulin Ratio 1.5    BUN/Creatinine Ratio 11.9    Anion Gap 12.5      CBC w/diff          3/7/2024    14:21   CBC w/Diff   WBC 8.25    RBC 5.57    Hemoglobin 15.3    Hematocrit 47.0    MCV 84.4    MCH 27.5    MCHC 32.6    RDW 13.4    Platelets 222    Neutrophil Rel % 71.4    Immature Granulocyte Rel % 0.5    Lymphocyte Rel % 20.1  "   Monocyte Rel % 6.1    Eosinophil Rel % 1.2    Basophil Rel % 0.7       Lipid Panel          3/7/2024    14:21   Lipid Panel   Total Cholesterol 212    Triglycerides 123    HDL Cholesterol 35    VLDL Cholesterol 22    LDL Cholesterol  155    LDL/HDL Ratio 4.35       Lab Results   Component Value Date    TSH 2.090 03/07/2024      Lab Results   Component Value Date    FREET4 1.01 03/07/2024         Lab Results   Component Value Date    RXMTICZF74 864 03/07/2024    LMTM98YO 35.5 03/07/2024    MG 2.0 03/07/2024        XR Knee 1 or 2 View Left    Result Date: 3/7/2024   No evidence for displaced fracture or dislocation.  Moderate tricompartmental osteoarthritic degenerative changes of the knee.      ENDER BETANCUR MD       Electronically Signed and Approved By: ENDER BETANCUR MD on 3/07/2024 at 16:40             XR Knee 1 or 2 View Right    Result Date: 3/7/2024   No evidence for displaced fracture or dislocation.  Moderate tricompartmental osteoarthritic degenerative changes of the knee.      ENDER BETANCUR MD       Electronically Signed and Approved By: ENDER BETANCUR MD on 3/07/2024 at 16:39             XR Chest PA & Lateral    Result Date: 3/7/2024   No active cardiopulmonary disease     ANUJ CARRILLO MD       Electronically Signed and Approved By: ANUJ CARRILLO MD on 3/07/2024 at 15:44                        ASSESSMENT & PLAN  Diagnoses and all orders for this visit:    1. Routine history and physical examination of adult (Primary)  Assessment & Plan:  Ages 40 to 64 Counseling/Anticipatory Guidance Discussed: nutrition, physical activity, healthy weight, injury prevention, misuse of tobacco, alcohol and drugs, dental health, mental health, immunizations, screenings, and use of seatbelts.    Orders:  -     atorvastatin (LIPITOR) 20 MG tablet; Take 1 tablet by mouth Daily.  Dispense: 30 tablet; Refill: 2  -     amLODIPine (NORVASC) 5 MG tablet; Take 1 tablet by mouth Daily.  Dispense: 30 tablet; Refill: 2    2.  Primary hypertension  Assessment & Plan:  Not controlled   Blood pressure at clinic dputiechh=807/98    Plan -add amlodipine 5 mg 1 daily continue with Maxide 37.5-25 1 tablet daily  Follow a low-salt diet-  Cut back on portions    Orders:  -     amLODIPine (NORVASC) 5 MG tablet; Take 1 tablet by mouth Daily.  Dispense: 30 tablet; Refill: 2  -     simethicone (Gas-X) 80 MG chewable tablet; Chew 1 tablet Every 6 (Six) Hours As Needed for Flatulence.  Dispense: 90 tablet; Refill: 0  -     Cologuard - Stool, Per Rectum; Future  -     Semaglutide-Weight Management (Wegovy) 0.25 MG/0.5ML solution auto-injector; Inject 0.5 mL under the skin into the appropriate area as directed 1 (One) Time Per Week. For 4 weeks  Dispense: 2 mL; Refill: 0  -     Semaglutide-Weight Management (Wegovy) 0.5 MG/0.5ML solution auto-injector; Inject 0.5 mL under the skin into the appropriate area as directed 1 (One) Time Per Week. For 4 weeks  Dispense: 2 mL; Refill: 1    3. Hypogonadism in male  Overview:  getting testosterone snots every every 2 weeks for past 5-6 yrs-by provider Dr Luevano    Orders:  -     Ambulatory Referral to Urology    4. Primary osteoarthritis of both knees  Assessment & Plan:  xrays-mod OA-saw Ortho Dr Barrios 3/19/24-for Visco injections  Pain is at-3-4/10      5. Morbid obesity with BMI of 40.0-44.9, adult  Comments:  Trying to follow better diet and increasing activity-discussed GLP-1 agonists-trying to cut back on portions  Orders:  -     simethicone (Gas-X) 80 MG chewable tablet; Chew 1 tablet Every 6 (Six) Hours As Needed for Flatulence.  Dispense: 90 tablet; Refill: 0  -     Cologuard - Stool, Per Rectum; Future  -     Semaglutide-Weight Management (Wegovy) 0.25 MG/0.5ML solution auto-injector; Inject 0.5 mL under the skin into the appropriate area as directed 1 (One) Time Per Week. For 4 weeks  Dispense: 2 mL; Refill: 0  -     Semaglutide-Weight Management (Wegovy) 0.5 MG/0.5ML solution auto-injector;  Inject 0.5 mL under the skin into the appropriate area as directed 1 (One) Time Per Week. For 4 weeks  Dispense: 2 mL; Refill: 1    6. Vitamin D deficiency    7. Tobacco dependence in remission    8. Pain of both elbows  Comments:  probable arthritis-no epicodylitic pain  Overview:  probable arthritis-no epicodylitic pain      9. Pure hypercholesterolemia  -     atorvastatin (LIPITOR) 20 MG tablet; Take 1 tablet by mouth Daily.  Dispense: 30 tablet; Refill: 2    10. Bloating  -     simethicone (Gas-X) 80 MG chewable tablet; Chew 1 tablet Every 6 (Six) Hours As Needed for Flatulence.  Dispense: 90 tablet; Refill: 0    11. Screen for colon cancer  -     Cologuard - Stool, Per Rectum; Future         Class 3 Severe Obesity (BMI >=40). Obesity-related health conditions include the following: hypertension, dyslipidemias, and osteoarthritis. Obesity is unchanged. BMI is is above average; BMI management plan is completed. We discussed low calorie, low carb based diet program, portion control, increasing exercise, joining a fitness center or start home based exercise program, and Weight Watchers or other Commercial based weight reduction program.       Patient Instructions   Start amlodipine 5 mg one daily  Cont with maxzide one daily  Simethincone prn   Start wegovy    Refer toDr Igal -Urology  F/u 2 months to check BP /chol/wegovy   Check BP 2x/d 3-4 x /week -goal BP <130/80    FOLLOW UP  Return in about 2 months (around 5/28/2024) for Recheck. Of BP    Patient was given instructions and counseling regarding his condition or for health maintenance advice. Please see specific information pulled into the AVS if appropriate.

## 2024-03-28 NOTE — PATIENT INSTRUCTIONS
Start amlodipine 5 mg one daily  Cont with maxzide one daily  Simethincone prn   Start wegovy    Refer toDr Igal -Urology  F/u 2 months to check BP /chol/wegovy

## 2024-03-28 NOTE — ASSESSMENT & PLAN NOTE
Ages 40 to 64 Counseling/Anticipatory Guidance Discussed: nutrition, physical activity, healthy weight, injury prevention, misuse of tobacco, alcohol and drugs, dental health, mental health, immunizations, screenings, and use of seatbelts.

## 2024-03-29 NOTE — ASSESSMENT & PLAN NOTE
Not controlled   Blood pressure at clinic vubywlusk=765/98    Plan -add amlodipine 5 mg 1 daily continue with Maxide 37.5-25 1 tablet daily  Follow a low-salt diet-  Cut back on portions

## 2024-04-01 ENCOUNTER — TELEPHONE (OUTPATIENT)
Dept: INTERNAL MEDICINE | Age: 59
End: 2024-04-01
Payer: COMMERCIAL

## 2024-04-01 DIAGNOSIS — E29.1 HYPOGONADISM IN MALE: Primary | ICD-10-CM

## 2024-04-03 ENCOUNTER — CLINICAL SUPPORT (OUTPATIENT)
Dept: INTERNAL MEDICINE | Age: 59
End: 2024-04-03
Payer: COMMERCIAL

## 2024-04-03 DIAGNOSIS — E29.1 HYPOGONADISM IN MALE: ICD-10-CM

## 2024-04-03 PROCEDURE — 84402 ASSAY OF FREE TESTOSTERONE: CPT | Performed by: INTERNAL MEDICINE

## 2024-04-03 PROCEDURE — 84403 ASSAY OF TOTAL TESTOSTERONE: CPT | Performed by: INTERNAL MEDICINE

## 2024-04-03 PROCEDURE — 36415 COLL VENOUS BLD VENIPUNCTURE: CPT | Performed by: INTERNAL MEDICINE

## 2024-04-04 ENCOUNTER — TELEPHONE (OUTPATIENT)
Dept: ORTHOPEDIC SURGERY | Facility: CLINIC | Age: 59
End: 2024-04-04
Payer: COMMERCIAL

## 2024-04-04 NOTE — TELEPHONE ENCOUNTER
SENT MSG THRU MY CHART:  4-4- 24    YOUR APPT WILL BE ON:   DATE:   4-25-24  TIME:   1:00 PM  REASON:  HAWA KNEE EUFLEXXA #1 INJECTION  PROVIDER:  BR[facility]  ADDRESS:  [address] [place] [address]    DATE:   5-2-24  TIME:   9:45 AM  REASON:  HAWA KNEE EUFLEXXA #2 INJECTION  PROVIDER:  [facility]  ADDRESS:  [address] [place] [address]    DATE:   5-9-24  TIME:   9:45 AM  REASON:  HAWA KNEE EUFLEXXA #3 INJECTION  PROVIDER:  [facility]  ADDRESS:  [address] [place] [address]    ANY RECENT INJECTION ON BOTH KNEES?    [employer]    IF YOU HAVE ANY QUESTIONS REGARDING YOUR APPOINTMENTS, PLEASE CALL US [phone].  THANKS

## 2024-04-04 NOTE — TELEPHONE ENCOUNTER
----- Message from Katharina Bojorquez sent at 3/26/2024  4:30 PM EDT -----  No PA required for bilateral knee Euflexxa injection. Per Marielos lozano to schedule. Please call and schedule with Dr. Barrios or Jose F. Thanks

## 2024-04-07 LAB
TESTOST FREE SERPL-MCNC: 11.9 PG/ML (ref 7.2–24)
TESTOST SERPL-MCNC: 712 NG/DL (ref 264–916)

## 2024-04-18 ENCOUNTER — OFFICE VISIT (OUTPATIENT)
Dept: UROLOGY | Facility: CLINIC | Age: 59
End: 2024-04-18
Payer: COMMERCIAL

## 2024-04-18 VITALS
HEART RATE: 79 BPM | HEIGHT: 71 IN | SYSTOLIC BLOOD PRESSURE: 138 MMHG | BODY MASS INDEX: 43.76 KG/M2 | WEIGHT: 312.6 LBS | DIASTOLIC BLOOD PRESSURE: 76 MMHG

## 2024-04-18 DIAGNOSIS — Z79.890 LONG-TERM CURRENT USE OF TESTOSTERONE REPLACEMENT THERAPY: ICD-10-CM

## 2024-04-18 DIAGNOSIS — N40.0 BENIGN PROSTATIC HYPERPLASIA WITHOUT LOWER URINARY TRACT SYMPTOMS: ICD-10-CM

## 2024-04-18 DIAGNOSIS — R79.89 LOW TESTOSTERONE: Primary | ICD-10-CM

## 2024-04-18 LAB
BILIRUB BLD-MCNC: NEGATIVE MG/DL
CLARITY, POC: CLEAR
COLOR UR: ABNORMAL
EXPIRATION DATE: ABNORMAL
GLUCOSE UR STRIP-MCNC: NEGATIVE MG/DL
KETONES UR QL: ABNORMAL
LEUKOCYTE EST, POC: NEGATIVE
Lab: ABNORMAL
NITRITE UR-MCNC: NEGATIVE MG/ML
PH UR: 7 [PH] (ref 5–8)
PROT UR STRIP-MCNC: NEGATIVE MG/DL
RBC # UR STRIP: NEGATIVE /UL
SP GR UR: 1.02 (ref 1–1.03)
UROBILINOGEN UR QL: ABNORMAL

## 2024-04-18 RX ORDER — SILDENAFIL 100 MG/1
TABLET, FILM COATED ORAL
COMMUNITY
End: 2024-04-18

## 2024-04-18 RX ORDER — TESTOSTERONE CYPIONATE 200 MG/ML
200 VIAL (ML) INTRAMUSCULAR
Qty: 6 ML | Refills: 3 | Status: SHIPPED | OUTPATIENT
Start: 2024-04-18 | End: 2024-10-15

## 2024-04-18 NOTE — PROGRESS NOTES
"Testosterone replacement therapy Chief Complaint  Hypogonadism (Pt has been taking testosterone for 5yrs his last injections has been a couple weeks ago pt is trying to establish continued care for the injections)    Subjective  No acute distress        Kevin Jackson presents to [facility]  History of Present Illness    59-year-old white male has been on testosterone replacement therapy for last 5 years getting 200 mg IM every 14 days.  It is helping his energy and also libido.  After 1 week of the injection patient does feel draggy and request to increase the duration of testosterone.  His testosterone in the blood was 712 about 2 weeks ago but that was done day after the injection.  His CBC and CMP was within acceptable limits.    Patient has been urinating without difficulties.  No family history of prostate cancer.  No dysuria or gross hematuria.    Objective no acute distress  Vital Signs:   /76 (BP Location: Left arm, Patient Position: Sitting, Cuff Size: Large Adult)   Pulse 79   Ht 180.3 cm (70.98\")   Wt (!) 142 kg (312 lb 9.6 oz)   BMI 43.62 kg/m²     No Known Allergies   Past medical history:  has a past medical history of GERD (gastroesophageal reflux disease).   Past surgical history:  has no past surgical history on file.  Personal history: Family history is unknown by patient.  Social history:  reports that he quit smoking about 9 years ago. His smoking use included cigarettes. He started smoking about 29 years ago. He has a 9.9 pack-year smoking history. He has never used smokeless tobacco.    Review of Systems    Please see past medical and surgical history    Physical Exam  Constitutional:       General: He is not in acute distress.     Appearance: Normal appearance. He is obese. He is not ill-appearing or toxic-appearing.   HENT:      Head: Normocephalic and atraumatic.      Ears:      Comments: No loss of hearing  Cardiovascular:      Rate and Rhythm: Normal " rate and regular rhythm.      Pulses: Normal pulses.      Heart sounds: Normal heart sounds. No murmur heard.  Pulmonary:      Effort: Pulmonary effort is normal. No respiratory distress.      Breath sounds: Normal breath sounds. No rhonchi or rales.   Abdominal:      Palpations: Abdomen is soft.      Tenderness: There is no abdominal tenderness. There is no right CVA tenderness or left CVA tenderness.      Hernia: A hernia is present.      Comments: Large umbilical hernia which does not bother him   Genitourinary:     Comments: Normal circumcised penis.    Right and left scrotum is normal.    Right left testicle and epididymis is normal.    ANTONIA.  Prostate gland is just about 25 g and benign  Musculoskeletal:         General: No swelling. Normal range of motion.      Cervical back: Normal range of motion and neck supple. No rigidity or tenderness.   Lymphadenopathy:      Cervical: No cervical adenopathy.   Skin:     General: Skin is warm.      Coloration: Skin is not jaundiced.   Neurological:      General: No focal deficit present.      Mental Status: He is alert and oriented to person, place, and time.      Motor: No weakness.      Gait: Gait normal.   Psychiatric:         Mood and Affect: Mood normal.         Behavior: Behavior normal.         Thought Content: Thought content normal.         Judgment: Judgment normal.        Result Review :                 Assessment and Plan    Diagnoses and all orders for this visit:    1. Low testosterone (Primary)  -     POC Urinalysis Dipstick, Automated    2. Long-term current use of testosterone replacement therapy    I will start the patient on testosterone 200 mg IM every 10 days and recheck him in 2-1/2 months after doing the blood test in 2 months time.  Patient is agreeable he and he knows the side effects of testosterone therapy which were discussedI have discussed the side effects of testosterone with the patient.  Patient can have borderline hypertension which can  give rise to cardiac issues.  Patient can have erythrocytosis and elevated erythropoietin and may have to donate blood depending upon the CBC.  Testosterone perse he has no relationship with inducing prostate cancer but if patient develop prostate cancer it will spread the tumor and its growth.  Patient can have thrombophlebitis of lower extremities and even pulmonary embolus.  Patient can also have sleep apnea and testosterone will exaggerate the situation.  In some trials patient has developed chronic kidney disease with testosterone therapy.     Brief Urine Lab Results  (Last result in the past 365 days)        Color   Clarity   Blood   Leuk Est   Nitrite   Protein   CREAT   Urine HCG        04/18/24 1005 Moraima   Clear   Negative   Negative   Negative   Negative                    Follow Up   No follow-ups on file.  Patient was given instructions and counseling regarding his condition or for health maintenance advice. Please see specific information pulled into the AVS if appropriate.     Antonette Montes MD

## 2024-04-19 ENCOUNTER — PATIENT ROUNDING (BHMG ONLY) (OUTPATIENT)
Dept: UROLOGY | Facility: CLINIC | Age: 59
End: 2024-04-19
Payer: COMMERCIAL

## 2024-04-25 ENCOUNTER — TELEPHONE (OUTPATIENT)
Dept: INTERNAL MEDICINE | Age: 59
End: 2024-04-25
Payer: COMMERCIAL

## 2024-04-25 ENCOUNTER — OFFICE VISIT (OUTPATIENT)
Dept: ORTHOPEDIC SURGERY | Facility: CLINIC | Age: 59
End: 2024-04-25
Payer: COMMERCIAL

## 2024-04-25 ENCOUNTER — HOSPITAL ENCOUNTER (OUTPATIENT)
Dept: RESPIRATORY THERAPY | Facility: HOSPITAL | Age: 59
Discharge: HOME | End: 2024-04-25
Payer: COMMERCIAL

## 2024-04-25 ENCOUNTER — HOSPITAL ENCOUNTER (OUTPATIENT)
Dept: CT IMAGING | Facility: HOSPITAL | Age: 59
Discharge: HOME | End: 2024-04-25
Payer: COMMERCIAL

## 2024-04-25 VITALS
WEIGHT: 313.05 LBS | HEART RATE: 82 BPM | DIASTOLIC BLOOD PRESSURE: 88 MMHG | SYSTOLIC BLOOD PRESSURE: 147 MMHG | OXYGEN SATURATION: 97 % | HEIGHT: 71 IN | BODY MASS INDEX: 43.83 KG/M2

## 2024-04-25 DIAGNOSIS — E66.01 MORBID OBESITY DUE TO EXCESS CALORIES: ICD-10-CM

## 2024-04-25 DIAGNOSIS — F17.201 TOBACCO DEPENDENCE IN REMISSION: ICD-10-CM

## 2024-04-25 DIAGNOSIS — M17.0 BILATERAL PRIMARY OSTEOARTHRITIS OF KNEE: Primary | ICD-10-CM

## 2024-04-25 DIAGNOSIS — R06.02 SHORTNESS OF BREATH: ICD-10-CM

## 2024-04-25 DIAGNOSIS — Z12.2 SCREENING FOR LUNG CANCER: ICD-10-CM

## 2024-04-25 PROCEDURE — 71271 CT THORAX LUNG CANCER SCR C-: CPT

## 2024-04-25 PROCEDURE — 94060 EVALUATION OF WHEEZING: CPT

## 2024-04-25 PROCEDURE — 94729 DIFFUSING CAPACITY: CPT

## 2024-04-25 PROCEDURE — 94726 PLETHYSMOGRAPHY LUNG VOLUMES: CPT

## 2024-04-25 RX ORDER — ALBUTEROL SULFATE 2.5 MG/3ML
2.5 SOLUTION RESPIRATORY (INHALATION) ONCE
Status: COMPLETED | OUTPATIENT
Start: 2024-04-25 | End: 2024-04-25

## 2024-04-25 RX ORDER — HYALURONATE SODIUM 10 MG/ML
20 SYRINGE (ML) INTRAARTICULAR
Status: COMPLETED | OUTPATIENT
Start: 2024-04-25 | End: 2024-04-25

## 2024-04-25 RX ADMIN — Medication 20 MG: at 11:48

## 2024-04-25 RX ADMIN — ALBUTEROL SULFATE 2.5 MG: 2.5 SOLUTION RESPIRATORY (INHALATION) at 07:56

## 2024-04-25 NOTE — TELEPHONE ENCOUNTER
OKAY FOR HUB TO READ/RELAY  LVM for the patient to call back and reschedule their May   28 th appointment due to the provider being out of office.

## 2024-04-25 NOTE — PROGRESS NOTES
"Chief Complaint  Pain and Follow-up of the Left Knee and Pain and Follow-up of the Right Knee    Subjective          Pain      Kevin Jackson is a 59 y.o. male  presents to [facility] for     Patient presents for follow-up evaluation of bilateral knee pain and bilateral knee osteoarthritis he is here for bilateral Euflexxa injection #1.  He saw Dr. Sophie Barrios recommended gel injections he has tried steroids in the past with no relief he also uses Voltaren gel with minimal relief and takes ibuprofen.      No Known Allergies     Social History     Socioeconomic History    Marital status:    Tobacco Use    Smoking status: Former     Current packs/day: 0.00     Average packs/day: 0.5 packs/day for 19.8 years (9.9 ttl pk-yrs)     Types: Cigarettes     Start date: 1995     Quit date: 3/1/2015     Years since quittin.1    Smokeless tobacco: Never   Vaping Use    Vaping status: Never Used        REVIEW OF SYSTEMS    Constitutional: Awake alert and oriented x3, no acute distress, denies fevers, chills, weight loss  Respiratory: No respiratory distress  Vascular: Brisk cap refill, Intact distal pulses, No cyanosis, compartments soft with no signs or symptoms of compartment syndrome or DVT.   Cardiovascular: Denies chest pain, shortness of breath  Skin: Denies rashes, acute skin changes  Neurologic: Denies headache, loss of consciousness  MSK: Bilateral knee pain      Objective   Vital Signs:   /88   Pulse 82   Ht 180.3 cm (71\")   Wt (!) 142 kg (313 lb 0.9 oz)   SpO2 97%   BMI 43.66 kg/m²     Body mass index is 43.66 kg/m².    Physical Exam       Right knee- Positive EHL, FHL, GS and TA. Sensation intact to all 5 nerves of the foot. Positive pulses. Non-tender. ROM -3 to 120 degrees. Stable to varus/valgus stress. Stable to anterior/posterior drawer. No effusion. Mild swelling. Negative Lachman's. Negative Canelo's. Positive crepitus.      Left knee- " Positive EHL, FHL, GS and TA. Sensation intact to all 5 nerves of the foot. Positive pulses. Non-tender. ROM 0 to 120 degrees. Stable to varus/valgus stress. Stable to anterior/posterior drawer. No effusion. Mild swelling. Negative Lachman's. Negative Canelo's. Positive crepitus.      Large Joint Arthrocentesis: R knee  Date/Time: 4/25/2024 11:48 AM  Consent given by: patient  Site marked: site marked  Timeout: Immediately prior to procedure a time out was called to verify the correct patient, procedure, equipment, support staff and site/side marked as required   Supporting Documentation  Indications: pain   Procedure Details  Location: knee - R knee  Preparation: Patient was prepped and draped in the usual sterile fashion  Needle gauge: 21G.  Approach: lateral  Medications administered: 20 mg Sodium Hyaluronate (Viscosup) 20 MG/2ML  Patient tolerance: patient tolerated the procedure well with no immediate complications (injection scribed for Jose F CRABTREE)      Large Joint Arthrocentesis: L knee  Date/Time: 4/25/2024 11:48 AM  Consent given by: patient  Site marked: site marked  Timeout: Immediately prior to procedure a time out was called to verify the correct patient, procedure, equipment, support staff and site/side marked as required   Supporting Documentation  Indications: pain   Procedure Details  Location: knee - L knee  Preparation: Patient was prepped and draped in the usual sterile fashion  Needle gauge: 21 G.  Approach: lateral  Medications administered: 20 mg Sodium Hyaluronate (Viscosup) 20 MG/2ML  Patient tolerance: patient tolerated the procedure well with no immediate complications (injection scribed for Jose F CRABTREE)      Imaging Results (Most Recent)       None             Result Review :   The following data was reviewed by: Gregoria Curran on 04/25/2024:               Assessment and Plan    Diagnoses and all orders for this visit:    1. Bilateral primary osteoarthritis of knee  (Primary)        Discussed diagnosis and treatment options with patient he elected to have bilateral knee Euflexxa injections which she tolerated well follow-up in 1 week for Euflexxa injection #2    Call or return if worsening symptoms.    Follow Up   No follow-ups on file.  Patient was given instructions and counseling regarding his condition or for health maintenance advice. Please see specific information pulled into the AVS if appropriate.       EMR Dragon/Transcription disclaimer:  Part of this note may be an electronic transcription/translation of spoken language to printed text using the Dragon Dictation System

## 2024-05-02 ENCOUNTER — OFFICE VISIT (OUTPATIENT)
Dept: ORTHOPEDIC SURGERY | Facility: CLINIC | Age: 59
End: 2024-05-02
Payer: COMMERCIAL

## 2024-05-02 VITALS
HEIGHT: 71 IN | BODY MASS INDEX: 43.83 KG/M2 | HEART RATE: 80 BPM | OXYGEN SATURATION: 94 % | WEIGHT: 313.05 LBS | DIASTOLIC BLOOD PRESSURE: 87 MMHG | SYSTOLIC BLOOD PRESSURE: 165 MMHG

## 2024-05-02 DIAGNOSIS — M17.0 BILATERAL PRIMARY OSTEOARTHRITIS OF KNEE: Primary | ICD-10-CM

## 2024-05-02 RX ORDER — HYALURONATE SODIUM 10 MG/ML
20 SYRINGE (ML) INTRAARTICULAR
Status: COMPLETED | OUTPATIENT
Start: 2024-05-02 | End: 2024-05-02

## 2024-05-02 RX ADMIN — Medication 20 MG: at 09:23

## 2024-05-02 RX ADMIN — Medication 20 MG: at 09:20

## 2024-05-02 NOTE — PROGRESS NOTES
"Chief Complaint  Pain and Follow-up of the Left Knee and Pain and Follow-up of the Right Knee    Subjective          Pain      Kevin Jackson is a 59 y.o. male  presents to [facility] for     Patient presents for follow-up evaluation of bilateral knee pain bilateral knee osteoarthritis and to receive Euflexxa injection #2.  Patient states the first injection has already been helping his knees he states the pain is not as sharp as it was prior he denies new injury or symptoms of pain in the last week he states he is happy with the current progress.      No Known Allergies     Social History     Socioeconomic History    Marital status:    Tobacco Use    Smoking status: Former     Current packs/day: 0.00     Average packs/day: 0.5 packs/day for 19.8 years (9.9 ttl pk-yrs)     Types: Cigarettes     Start date: 1995     Quit date: 3/1/2015     Years since quittin.1    Smokeless tobacco: Never   Vaping Use    Vaping status: Never Used        REVIEW OF SYSTEMS    Constitutional: Awake alert and oriented x3, no acute distress, denies fevers, chills, weight loss  Respiratory: No respiratory distress  Vascular: Brisk cap refill, Intact distal pulses, No cyanosis, compartments soft with no signs or symptoms of compartment syndrome or DVT.   Cardiovascular: Denies chest pain, shortness of breath  Skin: Denies rashes, acute skin changes  Neurologic: Denies headache, loss of consciousness  MSK: Bilateral knee pain      Objective   Vital Signs:   /87   Pulse 80   Ht 180.3 cm (71\")   Wt (!) 142 kg (313 lb 0.9 oz)   SpO2 94%   BMI 43.66 kg/m²     Body mass index is 43.66 kg/m².    Physical Exam       Right knee- Positive EHL, FHL, GS and TA. Sensation intact to all 5 nerves of the foot. Positive pulses. Non-tender. ROM -3 to 120 degrees. Stable to varus/valgus stress. Stable to anterior/posterior drawer. No effusion. Mild swelling. Negative Lachman's. Negative Canelo's. " Positive crepitus.      Left knee- Positive EHL, FHL, GS and TA. Sensation intact to all 5 nerves of the foot. Positive pulses. Non-tender. ROM 0 to 120 degrees. Stable to varus/valgus stress. Stable to anterior/posterior drawer. No effusion. Mild swelling. Negative Lachman's. Negative Canelo's. Positive crepitus.         Large Joint Arthrocentesis: R knee  Date/Time: 5/2/2024 9:20 AM  Consent given by: patient  Site marked: site marked  Timeout: Immediately prior to procedure a time out was called to verify the correct patient, procedure, equipment, support staff and site/side marked as required   Supporting Documentation  Indications: pain   Procedure Details  Location: knee - R knee  Preparation: Patient was prepped and draped in the usual sterile fashion  Needle gauge: 21 g.  Approach: lateral  Medications administered: 20 mg Sodium Hyaluronate (Viscosup) 20 MG/2ML  Patient tolerance: patient tolerated the procedure well with no immediate complications (injection scibed for Jose F CRABTREE)      Large Joint Arthrocentesis: L knee  Date/Time: 5/2/2024 9:23 AM  Consent given by: patient  Site marked: site marked  Timeout: Immediately prior to procedure a time out was called to verify the correct patient, procedure, equipment, support staff and site/side marked as required   Supporting Documentation  Indications: pain   Procedure Details  Location: knee - L knee  Preparation: Patient was prepped and draped in the usual sterile fashion  Needle gauge: 21 G.  Approach: lateral  Medications administered: 20 mg Sodium Hyaluronate (Viscosup) 20 MG/2ML  Patient tolerance: patient tolerated the procedure well with no immediate complications (injection scibed for Jose F CRABTREE)      Imaging Results (Most Recent)       None             Result Review :   The following data was reviewed by: Gregoria Curran on 05/02/2024:               Assessment and Plan    Diagnoses and all orders for this visit:    1. Bilateral primary  osteoarthritis of knee (Primary)        Discussed diagnosis and treatment options with the patient he elected to have bilateral knee Euflexxa injection #2 which he tolerated well follow-up in 1 week for Euflexxa injection #3    Call or return if worsening symptoms.    Follow Up   No follow-ups on file.  Patient was given instructions and counseling regarding his condition or for health maintenance advice. Please see specific information pulled into the AVS if appropriate.       EMR Dragon/Transcription disclaimer:  Part of this note may be an electronic transcription/translation of spoken language to printed text using the Dragon Dictation System

## 2024-05-09 ENCOUNTER — OFFICE VISIT (OUTPATIENT)
Dept: ORTHOPEDIC SURGERY | Facility: CLINIC | Age: 59
End: 2024-05-09
Payer: COMMERCIAL

## 2024-05-09 VITALS
WEIGHT: 313.05 LBS | DIASTOLIC BLOOD PRESSURE: 81 MMHG | HEART RATE: 76 BPM | OXYGEN SATURATION: 93 % | SYSTOLIC BLOOD PRESSURE: 141 MMHG | BODY MASS INDEX: 43.83 KG/M2 | HEIGHT: 71 IN

## 2024-05-09 DIAGNOSIS — M17.0 BILATERAL PRIMARY OSTEOARTHRITIS OF KNEE: Primary | ICD-10-CM

## 2024-05-09 RX ORDER — HYALURONATE SODIUM 10 MG/ML
20 SYRINGE (ML) INTRAARTICULAR
Status: COMPLETED | OUTPATIENT
Start: 2024-05-09 | End: 2024-05-09

## 2024-05-09 RX ADMIN — Medication 20 MG: at 10:02

## 2024-05-09 RX ADMIN — Medication 20 MG: at 10:03

## 2024-05-15 DIAGNOSIS — Z00.00 ROUTINE HISTORY AND PHYSICAL EXAMINATION OF ADULT: ICD-10-CM

## 2024-05-15 DIAGNOSIS — E78.00 PURE HYPERCHOLESTEROLEMIA: ICD-10-CM

## 2024-05-15 DIAGNOSIS — R06.02 SHORTNESS OF BREATH: ICD-10-CM

## 2024-05-15 DIAGNOSIS — I10 PRIMARY HYPERTENSION: ICD-10-CM

## 2024-05-15 DIAGNOSIS — F17.201 TOBACCO DEPENDENCE IN REMISSION: ICD-10-CM

## 2024-05-15 RX ORDER — ESCITALOPRAM OXALATE 20 MG/1
20 TABLET ORAL DAILY
Qty: 90 TABLET | Refills: 1 | Status: SHIPPED | OUTPATIENT
Start: 2024-05-15

## 2024-05-15 RX ORDER — TRIAMTERENE AND HYDROCHLOROTHIAZIDE 37.5; 25 MG/1; MG/1
1 TABLET ORAL DAILY
Qty: 30 TABLET | Refills: 2 | Status: SHIPPED | OUTPATIENT
Start: 2024-05-15

## 2024-05-15 RX ORDER — ATORVASTATIN CALCIUM 20 MG/1
20 TABLET, FILM COATED ORAL DAILY
Qty: 30 TABLET | Refills: 2 | Status: SHIPPED | OUTPATIENT
Start: 2024-05-15

## 2024-05-15 RX ORDER — TIOTROPIUM BROMIDE INHALATION SPRAY 3.12 UG/1
2 SPRAY, METERED RESPIRATORY (INHALATION)
Qty: 4 G | Refills: 2 | Status: SHIPPED | OUTPATIENT
Start: 2024-05-15

## 2024-05-15 RX ORDER — TRAZODONE HYDROCHLORIDE 100 MG/1
100 TABLET ORAL NIGHTLY
Qty: 90 TABLET | Refills: 1 | Status: SHIPPED | OUTPATIENT
Start: 2024-05-15

## 2024-05-15 RX ORDER — AMLODIPINE BESYLATE 5 MG/1
5 TABLET ORAL DAILY
Qty: 30 TABLET | Refills: 2 | Status: SHIPPED | OUTPATIENT
Start: 2024-05-15

## 2024-05-15 NOTE — TELEPHONE ENCOUNTER
Caller: Kevin Jackson    Relationship: Self    Best call back number: [phone]     Requested Prescriptions:   Requested Prescriptions     Pending Prescriptions Disp Refills    atorvastatin (LIPITOR) 20 MG tablet 30 tablet 2     Sig: Take 1 tablet by mouth Daily.    amLODIPine (NORVASC) 5 MG tablet 30 tablet 2     Sig: Take 1 tablet by mouth Daily.    triamterene-hydrochlorothiazide (Maxzide-25) 37.5-25 MG per tablet 30 tablet 2     Sig: Take 1 tablet by mouth Daily.    tiotropium bromide monohydrate (Spiriva Respimat) 2.5 MCG/ACT aerosol solution inhaler 4 g 2     Sig: Inhale 2 puffs Daily.    escitalopram (LEXAPRO) 20 MG tablet      traZODone (DESYREL) 100 MG tablet      pantoprazole (PROTONIX) 40 mg in 100mL NS [facility]          Pharmacy where request should be sent: [address]     Last office visit with prescribing clinician: 3/28/2024   Last telemedicine visit with prescribing clinician: Visit date not found   Next office visit with prescribing clinician: 6/3/2024     Additional details provided by patient: CALLER STATED THAT THESE MEDICATIONS HAVE BEEN PROVIDED BY DR HERMAN OR WERE DISCUSSED AS NEEDING TO HAVE FILLED IN THE FUTURE AS A NEW PATIENT.   HE IS NEEDING ALL TO GO TO PHARMACY IN MESSAGE AS A 90 DAY REFILL.     Does the patient have less than a 3 day supply:  [] Yes  [x] No      Mele Rachel   05/15/24 08:05 EDT

## 2024-06-02 PROBLEM — R79.89 LOW TESTOSTERONE IN MALE: Status: ACTIVE | Noted: 2024-06-02

## 2024-06-03 ENCOUNTER — OFFICE VISIT (OUTPATIENT)
Dept: INTERNAL MEDICINE | Age: 59
End: 2024-06-03
Payer: COMMERCIAL

## 2024-06-03 ENCOUNTER — TELEPHONE (OUTPATIENT)
Dept: UROLOGY | Facility: CLINIC | Age: 59
End: 2024-06-03
Payer: COMMERCIAL

## 2024-06-03 VITALS
TEMPERATURE: 97.7 F | BODY MASS INDEX: 43.4 KG/M2 | WEIGHT: 310 LBS | HEART RATE: 78 BPM | DIASTOLIC BLOOD PRESSURE: 96 MMHG | OXYGEN SATURATION: 94 % | HEIGHT: 71 IN | SYSTOLIC BLOOD PRESSURE: 138 MMHG

## 2024-06-03 DIAGNOSIS — F17.201 TOBACCO DEPENDENCE IN REMISSION: ICD-10-CM

## 2024-06-03 DIAGNOSIS — R79.89 LOW TESTOSTERONE: ICD-10-CM

## 2024-06-03 DIAGNOSIS — E78.00 PURE HYPERCHOLESTEROLEMIA: ICD-10-CM

## 2024-06-03 DIAGNOSIS — K21.9 GASTROESOPHAGEAL REFLUX DISEASE WITHOUT ESOPHAGITIS: ICD-10-CM

## 2024-06-03 DIAGNOSIS — Z11.59 NEED FOR HEPATITIS C SCREENING TEST: ICD-10-CM

## 2024-06-03 DIAGNOSIS — E66.01 MORBID OBESITY DUE TO EXCESS CALORIES: ICD-10-CM

## 2024-06-03 DIAGNOSIS — N40.0 BENIGN PROSTATIC HYPERPLASIA WITHOUT LOWER URINARY TRACT SYMPTOMS: ICD-10-CM

## 2024-06-03 DIAGNOSIS — M17.0 BILATERAL PRIMARY OSTEOARTHRITIS OF KNEE: ICD-10-CM

## 2024-06-03 DIAGNOSIS — I10 PRIMARY HYPERTENSION: Primary | ICD-10-CM

## 2024-06-03 DIAGNOSIS — R79.89 LOW TESTOSTERONE IN MALE: ICD-10-CM

## 2024-06-03 DIAGNOSIS — E55.9 VITAMIN D DEFICIENCY: ICD-10-CM

## 2024-06-03 DIAGNOSIS — E87.6 HYPOKALEMIA: ICD-10-CM

## 2024-06-03 DIAGNOSIS — Z79.890 LONG-TERM CURRENT USE OF TESTOSTERONE REPLACEMENT THERAPY: ICD-10-CM

## 2024-06-03 PROBLEM — E29.1 HYPOGONADISM IN MALE: Status: RESOLVED | Noted: 2024-03-07 | Resolved: 2024-06-03

## 2024-06-03 LAB
25(OH)D3 SERPL-MCNC: 36.2 NG/ML (ref 30–100)
ALBUMIN SERPL-MCNC: 4.1 G/DL (ref 3.5–5.2)
ALBUMIN/GLOB SERPL: 1.4 G/DL
ALP SERPL-CCNC: 78 U/L (ref 39–117)
ALT SERPL W P-5'-P-CCNC: 24 U/L (ref 1–41)
ANION GAP SERPL CALCULATED.3IONS-SCNC: 13.1 MMOL/L (ref 5–15)
AST SERPL-CCNC: 23 U/L (ref 1–40)
BASOPHILS # BLD AUTO: 0.07 10*3/MM3 (ref 0–0.2)
BASOPHILS NFR BLD AUTO: 1 % (ref 0–1.5)
BILIRUB SERPL-MCNC: 0.5 MG/DL (ref 0–1.2)
BUN SERPL-MCNC: 14 MG/DL (ref 6–20)
BUN/CREAT SERPL: 12.8 (ref 7–25)
CALCIUM SPEC-SCNC: 9.3 MG/DL (ref 8.6–10.5)
CHLORIDE SERPL-SCNC: 98 MMOL/L (ref 98–107)
CHOLEST SERPL-MCNC: 141 MG/DL (ref 0–200)
CO2 SERPL-SCNC: 24.9 MMOL/L (ref 22–29)
CREAT SERPL-MCNC: 1.09 MG/DL (ref 0.76–1.27)
DEPRECATED RDW RBC AUTO: 41.6 FL (ref 37–54)
EGFRCR SERPLBLD CKD-EPI 2021: 79.2 ML/MIN/1.73
EOSINOPHIL # BLD AUTO: 0.19 10*3/MM3 (ref 0–0.4)
EOSINOPHIL NFR BLD AUTO: 2.6 % (ref 0.3–6.2)
ERYTHROCYTE [DISTWIDTH] IN BLOOD BY AUTOMATED COUNT: 13.8 % (ref 12.3–15.4)
FOLATE SERPL-MCNC: 6.85 NG/ML (ref 4.78–24.2)
FSH SERPL-ACNC: <0.3 MIU/ML
GLOBULIN UR ELPH-MCNC: 2.9 GM/DL
GLUCOSE SERPL-MCNC: 91 MG/DL (ref 65–99)
HCT VFR BLD AUTO: 52.6 % (ref 37.5–51)
HCV AB SER QL: NORMAL
HDLC SERPL-MCNC: 36 MG/DL (ref 40–60)
HGB BLD-MCNC: 17.4 G/DL (ref 13–17.7)
IMM GRANULOCYTES # BLD AUTO: 0.03 10*3/MM3 (ref 0–0.05)
IMM GRANULOCYTES NFR BLD AUTO: 0.4 % (ref 0–0.5)
LDLC SERPL CALC-MCNC: 90 MG/DL (ref 0–100)
LDLC/HDLC SERPL: 2.48 {RATIO}
LH SERPL-ACNC: <0.3 MIU/ML
LYMPHOCYTES # BLD AUTO: 1.62 10*3/MM3 (ref 0.7–3.1)
LYMPHOCYTES NFR BLD AUTO: 22.2 % (ref 19.6–45.3)
MCH RBC QN AUTO: 27.8 PG (ref 26.6–33)
MCHC RBC AUTO-ENTMCNC: 33.1 G/DL (ref 31.5–35.7)
MCV RBC AUTO: 84 FL (ref 79–97)
MONOCYTES # BLD AUTO: 0.48 10*3/MM3 (ref 0.1–0.9)
MONOCYTES NFR BLD AUTO: 6.6 % (ref 5–12)
NEUTROPHILS NFR BLD AUTO: 4.91 10*3/MM3 (ref 1.7–7)
NEUTROPHILS NFR BLD AUTO: 67.2 % (ref 42.7–76)
NRBC BLD AUTO-RTO: 0 /100 WBC (ref 0–0.2)
PLATELET # BLD AUTO: 274 10*3/MM3 (ref 140–450)
PMV BLD AUTO: 10.8 FL (ref 6–12)
POTASSIUM SERPL-SCNC: 3.4 MMOL/L (ref 3.5–5.2)
PROLACTIN SERPL-MCNC: 15.9 NG/ML (ref 4.04–15.2)
PROT SERPL-MCNC: 7 G/DL (ref 6–8.5)
PSA SERPL-MCNC: 0.86 NG/ML (ref 0–4)
RBC # BLD AUTO: 6.26 10*6/MM3 (ref 4.14–5.8)
SODIUM SERPL-SCNC: 136 MMOL/L (ref 136–145)
TRIGL SERPL-MCNC: 79 MG/DL (ref 0–150)
VIT B12 BLD-MCNC: 739 PG/ML (ref 211–946)
VLDLC SERPL-MCNC: 15 MG/DL (ref 5–40)
WBC NRBC COR # BLD AUTO: 7.3 10*3/MM3 (ref 3.4–10.8)

## 2024-06-03 PROCEDURE — 82306 VITAMIN D 25 HYDROXY: CPT | Performed by: INTERNAL MEDICINE

## 2024-06-03 PROCEDURE — 82607 VITAMIN B-12: CPT | Performed by: INTERNAL MEDICINE

## 2024-06-03 PROCEDURE — 83002 ASSAY OF GONADOTROPIN (LH): CPT | Performed by: UROLOGY

## 2024-06-03 PROCEDURE — 84402 ASSAY OF FREE TESTOSTERONE: CPT | Performed by: UROLOGY

## 2024-06-03 PROCEDURE — 84270 ASSAY OF SEX HORMONE GLOBUL: CPT | Performed by: UROLOGY

## 2024-06-03 PROCEDURE — 84403 ASSAY OF TOTAL TESTOSTERONE: CPT | Performed by: UROLOGY

## 2024-06-03 PROCEDURE — 99214 OFFICE O/P EST MOD 30 MIN: CPT | Performed by: INTERNAL MEDICINE

## 2024-06-03 PROCEDURE — 85025 COMPLETE CBC W/AUTO DIFF WBC: CPT | Performed by: UROLOGY

## 2024-06-03 PROCEDURE — 86803 HEPATITIS C AB TEST: CPT | Performed by: INTERNAL MEDICINE

## 2024-06-03 PROCEDURE — 84153 ASSAY OF PSA TOTAL: CPT | Performed by: UROLOGY

## 2024-06-03 PROCEDURE — 36415 COLL VENOUS BLD VENIPUNCTURE: CPT | Performed by: INTERNAL MEDICINE

## 2024-06-03 PROCEDURE — 82670 ASSAY OF TOTAL ESTRADIOL: CPT | Performed by: UROLOGY

## 2024-06-03 PROCEDURE — 84146 ASSAY OF PROLACTIN: CPT | Performed by: UROLOGY

## 2024-06-03 PROCEDURE — 80061 LIPID PANEL: CPT | Performed by: UROLOGY

## 2024-06-03 PROCEDURE — 80053 COMPREHEN METABOLIC PANEL: CPT | Performed by: UROLOGY

## 2024-06-03 PROCEDURE — 82746 ASSAY OF FOLIC ACID SERUM: CPT | Performed by: INTERNAL MEDICINE

## 2024-06-03 PROCEDURE — 83001 ASSAY OF GONADOTROPIN (FSH): CPT | Performed by: UROLOGY

## 2024-06-03 PROCEDURE — 82681 ASSAY DIR MEAS FR ESTRADIOL: CPT | Performed by: UROLOGY

## 2024-06-03 RX ORDER — POTASSIUM CHLORIDE 20 MEQ/1
20 TABLET, EXTENDED RELEASE ORAL 2 TIMES DAILY
Qty: 30 TABLET | Refills: 5 | Status: SHIPPED | OUTPATIENT
Start: 2024-06-03

## 2024-06-03 RX ORDER — TRIAMTERENE AND HYDROCHLOROTHIAZIDE 75; 50 MG/1; MG/1
1 TABLET ORAL DAILY
Qty: 90 TABLET | Refills: 1 | Status: SHIPPED | OUTPATIENT
Start: 2024-06-03

## 2024-06-03 NOTE — PATIENT INSTRUCTIONS
Increase maxzide 75/50 once daily - goal BP <130/80 monitor BP at home  Do labs today   F/u 4-6 weeks to check BP

## 2024-06-03 NOTE — ASSESSMENT & PLAN NOTE
getting euflexxa (hyaluronic acid injection) which is significantly helping pain continue follow-up with Ortho

## 2024-06-03 NOTE — ASSESSMENT & PLAN NOTE
Chol-- , HDL 35, Tchol 212-not on any meds    Plan-follow a low-cholesterol diet goal LDL less than 130

## 2024-06-03 NOTE — PROGRESS NOTES
CHIEF COMPLAINT  Kevin Jackson presents to [facility] for follow-up of Follow-up (2 follow up.  Patient has no new issues or concerns ), Hypertension, and Hyperlipidemia.    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  Plan of care is as follows below.     Main concern elbow pain -arthritic- mild relief with ibuprofen and feels swollen up after eating-for past 6 months     Chol- ABW=177, HDL=35, syfz=891     HTN-taking meds-elevated-BP at home 149-150/90s     Hypogonadism-on chronic shots     Knee xrays-mod OA-saw Ortho Dr Barrios 3/19/24-for Visco injections  Pain is at-3-4/10     CXR-NAD     Patient Instructions 3/7/24   Do labs today   Xrays of knees and CXR  Do pfts  And low dose Ct chest  Refer to Ortho-Dr Tracy-for further evaluation of knee pain  Refer to endocrinologist for evaluation of hypogonadism and continuation of meds  Cut down portions  Monitor BP 2 x/day - 3-4 x/week and record goal is blood pressure less than 130/80  Start triamterene hydrochlorothiazide 1 tablet daily for blood pressure control  Follow-up in 3 weeks to check blood pressure and labs     3/7/24 visit  patient here to establish care-previous PCP was  "Dr Luevano- in [facility].     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       Current Outpatient Medications:     amLODIPine (NORVASC) 5 MG tablet, Take 1 tablet by mouth Daily., Disp: 30 tablet, Rfl: 2    atorvastatin (LIPITOR) 20 MG tablet, Take 1 tablet by mouth Daily., Disp: 30 tablet, Rfl: 2    escitalopram (LEXAPRO) 20 MG tablet, Take 1 tablet by mouth Daily., Disp: 90 tablet, Rfl: 1    ibuprofen (ADVIL,MOTRIN) 400 MG tablet, Take 1 tablet by mouth Every 6 (Six) Hours As Needed for Mild Pain., Disp: , Rfl:     Testosterone Cypionate 200 MG/ML solution, Inject 1 mL as directed Every 10 (Ten) Days for 180 days., Disp: 6 mL, Rfl: 3    tiotropium bromide monohydrate (Spiriva Respimat) 2.5 MCG/ACT aerosol solution inhaler, Inhale 2 puffs Daily., Disp: 4 g, Rfl: 2    traZODone (DESYREL) 100 MG tablet, Take 1 tablet by mouth Every Night., Disp: 90 tablet, Rfl: 1    triamterene-hydrochlorothiazide (Maxzide) 75-50 MG per tablet, Take 1 tablet by mouth Daily., Disp: 90 tablet, Rfl: 1   PFS reviewed.      OBJECTIVE  Vital Signs  Vitals:    06/03/24 0721 06/03/24 0802   BP: 140/90 138/96   BP Location: Left arm Left arm   Patient Position: Sitting Sitting   Cuff Size: Large Adult Large Adult   Pulse: 78    Temp: 97.7 °F (36.5 °C)    TempSrc: Temporal    SpO2: 94%    Weight: (!) 141 kg (310 lb)    Height: 180.3 cm (71\")  " "     Body mass index is 43.24 kg/m².    Physical Exam  Vitals and nursing note reviewed.   Constitutional:       Appearance: Normal appearance.   HENT:      Head: Normocephalic and atraumatic.      Nose: Nose normal.   Eyes:      Extraocular Movements: Extraocular movements intact.      Pupils: Pupils are equal, round, and reactive to light.   Cardiovascular:      Rate and Rhythm: Normal rate and regular rhythm.   Pulmonary:      Effort: Pulmonary effort is normal.      Breath sounds: Normal breath sounds.   Abdominal:      General: Abdomen is flat.      Palpations: Abdomen is soft.   Musculoskeletal:      Cervical back: Normal range of motion and neck supple.   Skin:     General: Skin is warm and dry.   Neurological:      General: No focal deficit present.      Mental Status: He is alert and oriented to person, place, and time.   Psychiatric:         Mood and Affect: Mood normal.         Behavior: Behavior normal.          RESULTS REVIEW  No results found for: \"PROBNP\", \"BNP\"  CMP          3/7/2024    14:21   CMP   Glucose 83    BUN 12    Creatinine 1.01    EGFR 87.3    Sodium 136    Potassium 3.8    Chloride 101    Calcium 9.1    Total Protein 7.2    Albumin 4.3    Globulin 2.9    Total Bilirubin 0.5    Alkaline Phosphatase 66    AST (SGOT) 26    ALT (SGPT) 25    Albumin/Globulin Ratio 1.5    BUN/Creatinine Ratio 11.9    Anion Gap 12.5      CBC w/diff          3/7/2024    14:21   CBC w/Diff   WBC 8.25    RBC 5.57    Hemoglobin 15.3    Hematocrit 47.0    MCV 84.4    MCH 27.5    MCHC 32.6    RDW 13.4    Platelets 222    Neutrophil Rel % 71.4    Immature Granulocyte Rel % 0.5    Lymphocyte Rel % 20.1    Monocyte Rel % 6.1    Eosinophil Rel % 1.2    Basophil Rel % 0.7       Lipid Panel          3/7/2024    14:21   Lipid Panel   Total Cholesterol 212    Triglycerides 123    HDL Cholesterol 35    VLDL Cholesterol 22    LDL Cholesterol  155    LDL/HDL Ratio 4.35       Lab Results   Component Value Date    TSH 2.090 " 03/07/2024      Lab Results   Component Value Date    FREET4 1.01 03/07/2024         Lab Results   Component Value Date    JJJOYSTN27 864 03/07/2024    OTJN90JG 35.5 03/07/2024    MG 2.0 03/07/2024        CT Chest Low Dose Cancer Screening WO    Result Date: 4/26/2024  Impression: 1. No suspicious pulmonary nodule. Recommend continued low-dose lung CT follow-up in 1 year. 2. Two small peripheral bronchoceles in the right upper lobe as above.  Recommendation: Continue annual screening with LDCT  Lung Rads Assessment: Lung-RADS L2 - Benign appearance or <1% chance of malignancy.    Electronically Signed By-Kristopher Wynn MD On:4/26/2024 1:04 PM      XR Knee 1 or 2 View Left    Result Date: 3/7/2024   No evidence for displaced fracture or dislocation.  Moderate tricompartmental osteoarthritic degenerative changes of the knee.      ENDER BETANCUR MD       Electronically Signed and Approved By: ENDER BETANCUR MD on 3/07/2024 at 16:40             XR Knee 1 or 2 View Right    Result Date: 3/7/2024   No evidence for displaced fracture or dislocation.  Moderate tricompartmental osteoarthritic degenerative changes of the knee.      ENDER BETANCUR MD       Electronically Signed and Approved By: ENDER BETANCUR MD on 3/07/2024 at 16:39             XR Chest PA & Lateral    Result Date: 3/7/2024   No active cardiopulmonary disease     ANUJ CARRILLO MD       Electronically Signed and Approved By: ANUJ CARRILLO MD on 3/07/2024 at 15:44                        ASSESSMENT & PLAN  Diagnoses and all orders for this visit:    1. Primary hypertension (Primary)  Assessment & Plan:  Not controlled   Blood pressure at clinic initially= 138/96  at home BP = up to 150s/.90s-increase maxzide -goal BP <130/80    Plan -continue with amlodipine 5 mg 1 daily and increase Maxide to 75-50 mg 1 tablet daily  Follow a low-salt diet-  Cut back on portions  Follow-up in 5 weeks to check blood pressure    Orders:  -     triamterene-hydrochlorothiazide  (Maxzide) 75-50 MG per tablet; Take 1 tablet by mouth Daily.  Dispense: 90 tablet; Refill: 1  -     Comprehensive Metabolic Panel; Future  -     Lipid Panel; Future  -     TSH+Free T4; Future  -     T3, Free; Future  -     Vitamin B12; Future  -     Folate; Future  -     Magnesium; Future  -     Vitamin D,25-Hydroxy; Future  -     CBC & Differential; Future  -     Comprehensive Metabolic Panel  -     Lipid Panel  -     Vitamin D,25-Hydroxy  -     Vitamin B12  -     Folate  -     Cancel: CBC & Differential    2. Pure hypercholesterolemia  Assessment & Plan:  Chol-- , HDL 35, Tchol 212-not on any meds    Plan-follow a low-cholesterol diet goal LDL less than 130    Orders:  -     Comprehensive Metabolic Panel; Future  -     Lipid Panel; Future  -     TSH+Free T4; Future  -     T3, Free; Future  -     Vitamin B12; Future  -     Folate; Future  -     Magnesium; Future  -     Vitamin D,25-Hydroxy; Future  -     CBC & Differential; Future  -     Comprehensive Metabolic Panel  -     Lipid Panel  -     Vitamin D,25-Hydroxy  -     Vitamin B12  -     Folate  -     Cancel: CBC & Differential    3. Morbid obesity due to excess calories  -     Comprehensive Metabolic Panel; Future  -     Lipid Panel; Future  -     TSH+Free T4; Future  -     T3, Free; Future  -     Vitamin B12; Future  -     Folate; Future  -     Magnesium; Future  -     Vitamin D,25-Hydroxy; Future  -     CBC & Differential; Future  -     Comprehensive Metabolic Panel  -     Lipid Panel  -     Vitamin D,25-Hydroxy  -     Vitamin B12  -     Folate  -     Cancel: CBC & Differential    4. Vitamin D deficiency  -     Comprehensive Metabolic Panel; Future  -     Lipid Panel; Future  -     TSH+Free T4; Future  -     T3, Free; Future  -     Vitamin B12; Future  -     Folate; Future  -     Magnesium; Future  -     Vitamin D,25-Hydroxy; Future  -     CBC & Differential; Future  -     Comprehensive Metabolic Panel  -     Lipid Panel  -     Vitamin D,25-Hydroxy  -      Vitamin B12  -     Folate  -     Cancel: CBC & Differential    5. Tobacco dependence in remission  -     Comprehensive Metabolic Panel; Future  -     Lipid Panel; Future  -     TSH+Free T4; Future  -     T3, Free; Future  -     Vitamin B12; Future  -     Folate; Future  -     Magnesium; Future  -     Vitamin D,25-Hydroxy; Future  -     CBC & Differential; Future  -     Comprehensive Metabolic Panel  -     Lipid Panel  -     Vitamin D,25-Hydroxy  -     Vitamin B12  -     Folate  -     Cancel: CBC & Differential    6. Bilateral primary osteoarthritis of knee  Assessment & Plan:  getting euflexxa (hyaluronic acid injection) which is significantly helping pain continue follow-up with Ortho      Orders:  -     Comprehensive Metabolic Panel; Future  -     Lipid Panel; Future  -     TSH+Free T4; Future  -     T3, Free; Future  -     Vitamin B12; Future  -     Folate; Future  -     Magnesium; Future  -     Vitamin D,25-Hydroxy; Future  -     CBC & Differential; Future  -     Comprehensive Metabolic Panel  -     Lipid Panel  -     Vitamin D,25-Hydroxy  -     Vitamin B12  -     Folate  -     Cancel: CBC & Differential    7. Low testosterone in male  Assessment & Plan:  -ffd by urology Dr. Madrid-now on 200 mg every 10 days    Orders:  -     Comprehensive Metabolic Panel; Future  -     Lipid Panel; Future  -     TSH+Free T4; Future  -     T3, Free; Future  -     Vitamin B12; Future  -     Folate; Future  -     Magnesium; Future  -     Vitamin D,25-Hydroxy; Future  -     CBC & Differential; Future  -     Comprehensive Metabolic Panel  -     Lipid Panel  -     Vitamin D,25-Hydroxy  -     Vitamin B12  -     Folate  -     Cancel: CBC & Differential    8. Gastroesophageal reflux disease without esophagitis  Assessment & Plan:  Continue with PPI once daily advised to cut down on chronic use of ibuprofen-already using diclofenac gel as needed-but did not help  Try to alternate ibuprofen BID otc with pepcid daily    Orders:  -      Comprehensive Metabolic Panel; Future  -     Lipid Panel; Future  -     TSH+Free T4; Future  -     T3, Free; Future  -     Vitamin B12; Future  -     Folate; Future  -     Magnesium; Future  -     Vitamin D,25-Hydroxy; Future  -     CBC & Differential; Future  -     Comprehensive Metabolic Panel  -     Lipid Panel  -     Vitamin D,25-Hydroxy  -     Vitamin B12  -     Folate  -     Cancel: CBC & Differential    9. Need for hepatitis C screening test  -     Hepatitis C Antibody; Future  -     Hepatitis C Antibody    10. Low testosterone  -     Testosterone (Free & Total), LC / MS  -     CBC & Differential  -     Sex Horm Binding Globulin  -     Estradiol, Free Serum  -     Cancel: Comprehensive Metabolic Panel  -     Follicle Stimulating Hormone  -     Luteinizing Hormone  -     Prolactin    11. Long-term current use of testosterone replacement therapy  -     Testosterone (Free & Total), LC / MS  -     CBC & Differential  -     Sex Horm Binding Globulin  -     Estradiol, Free Serum  -     PSA DIAGNOSTIC  -     Cancel: Comprehensive Metabolic Panel  -     Follicle Stimulating Hormone  -     Luteinizing Hormone  -     Prolactin    12. Benign prostatic hyperplasia without lower urinary tract symptoms  -     PSA DIAGNOSTIC                 Patient Instructions   Increase maxzide 75/50 once daily - goal BP <130/80 monitor BP at home  Do labs today   F/u 4-6 weeks to check BP       6/3/24 addendum--K =3.4--add KCL 20 meq daily   FOLLOW UP  Return in about 5 weeks (around 7/10/2024) for Recheck.  Blood pressure    Patient was given instructions and counseling regarding his condition or for health maintenance advice. Please see specific information pulled into the AVS if appropriate.

## 2024-06-03 NOTE — ASSESSMENT & PLAN NOTE
Continue with PPI once daily advised to cut down on chronic use of ibuprofen-already using diclofenac gel as needed-but did not help  Try to alternate ibuprofen BID otc with pepcid daily

## 2024-06-03 NOTE — TELEPHONE ENCOUNTER
I called Mr. Jackson and left a message on his telephone.  His hematocrit is 52.6 and hemoglobin  is 17.4.  Patient needs to donate 1 unit of blood as soon as possible.

## 2024-06-03 NOTE — ASSESSMENT & PLAN NOTE
Not controlled   Blood pressure at clinic initially= 138/96  at home BP = up to 150s/.90s-increase maxzide -goal BP <130/80    Plan -continue with amlodipine 5 mg 1 daily and increase Maxide to 75-50 mg 1 tablet daily  Follow a low-salt diet-  Cut back on portions  Follow-up in 5 weeks to check blood pressure

## 2024-06-04 LAB — SHBG SERPL-SCNC: 28.6 NMOL/L (ref 19.3–76.4)

## 2024-06-11 LAB
TESTOST FREE SERPL-MCNC: 28.3 PG/ML (ref 7.2–24)
TESTOST SERPL-MCNC: 1268.2 NG/DL (ref 264–916)

## 2024-06-12 ENCOUNTER — TELEPHONE (OUTPATIENT)
Dept: UROLOGY | Facility: CLINIC | Age: 59
End: 2024-06-12
Payer: COMMERCIAL

## 2024-06-12 DIAGNOSIS — Z79.890 LONG-TERM CURRENT USE OF TESTOSTERONE REPLACEMENT THERAPY: ICD-10-CM

## 2024-06-12 DIAGNOSIS — E29.1 HYPOGONADISM IN MALE: Primary | ICD-10-CM

## 2024-06-12 RX ORDER — TESTOSTERONE CYPIONATE 200 MG/ML
100 VIAL (ML) INTRAMUSCULAR
Qty: 10 ML | Refills: 1 | Status: SHIPPED | OUTPATIENT
Start: 2024-06-12 | End: 2024-12-09

## 2024-06-12 NOTE — TELEPHONE ENCOUNTER
I called and talked to Mr. Jackson and to limit his testosterone is over 1200.  I am going to start him on 100 mg of testosterone IM q. 7 days from now on.  Patient says he has appointment may be July so we will check with him at that time.

## 2024-06-17 ENCOUNTER — TELEPHONE (OUTPATIENT)
Dept: UROLOGY | Facility: CLINIC | Age: 59
End: 2024-06-17
Payer: COMMERCIAL

## 2024-06-17 LAB
ESTRADIOL FREE MFR SERPL: 2.7 %
ESTRADIOL FREE SERPL-MCNC: 3.8 PG/ML
ESTRADIOL SERPL HS-MCNC: 142 PG/ML

## 2024-06-17 NOTE — TELEPHONE ENCOUNTER
I called Mr. Jackson and talk to him informed him about his hemoglobin which is 17.4 and hematocrit is 52.6.  Hematocrit is elevated and I have asked him to donate 1 unit of blood.  Patient says it is okay.

## 2024-06-20 ENCOUNTER — TELEPHONE (OUTPATIENT)
Dept: INTERNAL MEDICINE | Age: 59
End: 2024-06-20

## 2024-06-20 DIAGNOSIS — R06.00 DYSPNEA, UNSPECIFIED TYPE: Primary | ICD-10-CM

## 2024-06-20 RX ORDER — ALBUTEROL SULFATE 90 UG/1
2 AEROSOL, METERED RESPIRATORY (INHALATION) EVERY 4 HOURS PRN
Qty: 18 G | Refills: 5 | Status: SHIPPED | OUTPATIENT
Start: 2024-06-20

## 2024-06-20 NOTE — TELEPHONE ENCOUNTER
Caller: Kevin Jackson    Relationship to patient: Self    Best call back number: [phone]    Patient is needing: PATIENT STATED MD BETANCOURT WAS SUPPOSE TO SEND HIM IN A RESCUE INHALER. HE HAS NOT RECEIVED A PRESCRIPTION FOR THIS. PLEASE SEND TO:       [facility] - [place] [address] [address] - [phone] [facility] [phone]  [phone]

## 2024-07-10 ENCOUNTER — OFFICE VISIT (OUTPATIENT)
Dept: UROLOGY | Facility: CLINIC | Age: 59
End: 2024-07-10
Payer: COMMERCIAL

## 2024-07-10 VITALS
HEART RATE: 81 BPM | SYSTOLIC BLOOD PRESSURE: 150 MMHG | BODY MASS INDEX: 43.26 KG/M2 | WEIGHT: 309 LBS | TEMPERATURE: 98.4 F | DIASTOLIC BLOOD PRESSURE: 83 MMHG | HEIGHT: 71 IN

## 2024-07-10 DIAGNOSIS — R79.89 PROLACTIN INCREASED: ICD-10-CM

## 2024-07-10 DIAGNOSIS — R79.89 LOW TESTOSTERONE: Primary | ICD-10-CM

## 2024-07-10 DIAGNOSIS — Z79.890 LONG-TERM CURRENT USE OF TESTOSTERONE REPLACEMENT THERAPY: ICD-10-CM

## 2024-07-10 DIAGNOSIS — N40.0 BENIGN PROSTATIC HYPERPLASIA WITHOUT LOWER URINARY TRACT SYMPTOMS: ICD-10-CM

## 2024-07-10 DIAGNOSIS — E29.1 HYPOGONADISM IN MALE: ICD-10-CM

## 2024-07-10 LAB
BILIRUB BLD-MCNC: NEGATIVE MG/DL
CLARITY, POC: CLEAR
COLOR UR: NORMAL
EXPIRATION DATE: NORMAL
GLUCOSE UR STRIP-MCNC: NEGATIVE MG/DL
KETONES UR QL: NEGATIVE
LEUKOCYTE EST, POC: NEGATIVE
Lab: NORMAL
NITRITE UR-MCNC: NEGATIVE MG/ML
PH UR: 6 [PH] (ref 5–8)
PROT UR STRIP-MCNC: NEGATIVE MG/DL
RBC # UR STRIP: NEGATIVE /UL
SP GR UR: 1.03 (ref 1–1.03)
UROBILINOGEN UR QL: NORMAL

## 2024-07-10 RX ORDER — TESTOSTERONE CYPIONATE 200 MG/ML
100 VIAL (ML) INTRAMUSCULAR
Qty: 10 ML | Refills: 1 | Status: SHIPPED | OUTPATIENT
Start: 2024-07-10 | End: 2025-01-06

## 2024-07-10 NOTE — PROGRESS NOTES
"Chief Complaint  Hypogonadism (Needs refill on testosterone)    Testosterone replacement therapy    Subjective no acute distress        Kevin Jackson presents to [facility]  History of Present Illness    59-year-old white male has been on testosterone replacement therapy for the last 5 years or more.  Patient is getting testosterone cypionate 100 mg IM q. 7 days.  PSA on 6/3/2024 is 0.864.  Patient has improvement in libido and energy.  His erections are fine and he can have climax and ejaculation.    Patient is urinating without difficulties.  No dysuria or gross hematuria.  No family history of prostate cancer.    Objective no acute distress  Vital Signs:   /83 (BP Location: Left arm, Patient Position: Sitting, Cuff Size: Large Adult)   Pulse 81   Temp 98.4 °F (36.9 °C) (Temporal)   Ht 180.3 cm (70.98\")   Wt (!) 140 kg (309 lb)   BMI 43.12 kg/m²     No Known Allergies   Past medical history:  has a past medical history of GERD (gastroesophageal reflux disease) and Hypertension.   Past surgical history:  has no past surgical history on file.  Personal history: Family history is unknown by patient.  Social history:  reports that he quit smoking about 9 years ago. His smoking use included cigarettes. He started smoking about 29 years ago. He has a 9.9 pack-year smoking history. He has been exposed to tobacco smoke. He has never used smokeless tobacco. He reports that he does not currently use alcohol. He reports that he does not use drugs.    Review of Systems    Please see past medical and surgical history    Physical Exam  Constitutional:       General: He is not in acute distress.     Appearance: Normal appearance. He is obese. He is not ill-appearing or toxic-appearing.   HENT:      Head: Normocephalic and atraumatic.      Ears:      Comments: No loss of hearing  Abdominal:      Palpations: Abdomen is soft. There is no mass.      Tenderness: There is no abdominal " tenderness. There is no right CVA tenderness or left CVA tenderness.      Hernia: A hernia is present.      Comments: Divarication of recti and umbilical hernia present   Genitourinary:     Comments: Normal circumcised penis.    Right and left scrotum is normal.    Right and left testicle and epididymis is normal.  They are smaller in size because of the hormone therapy  Musculoskeletal:         General: Normal range of motion.   Skin:     General: Skin is warm.      Coloration: Skin is not jaundiced.   Neurological:      General: No focal deficit present.      Mental Status: He is alert and oriented to person, place, and time.      Motor: No weakness.      Gait: Gait normal.   Psychiatric:         Mood and Affect: Mood normal.         Behavior: Behavior normal.         Thought Content: Thought content normal.         Judgment: Judgment normal.        Result Review :                 Assessment and Plan    Diagnoses and all orders for this visit:    1. Low testosterone (Primary)  -     POC Urinalysis Dipstick, Automated    2. Long-term current use of testosterone replacement therapy  -     Testosterone Cypionate 200 MG/ML solution; Inject 0.5 mL as directed Every 7 (Seven) Days for 180 days.  Dispense: 10 mL; Refill: 1    3. Hypogonadism in male  -     Testosterone Cypionate 200 MG/ML solution; Inject 0.5 mL as directed Every 7 (Seven) Days for 180 days.  Dispense: 10 mL; Refill: 1    4. Benign prostatic hyperplasia without lower urinary tract symptoms    Will continue testosterone cypionate 100 mg IM q. 7 days and do the testosterone panel in 2 months.     Brief Urine Lab Results  (Last result in the past 365 days)        Color   Clarity   Blood   Leuk Est   Nitrite   Protein   CREAT   Urine HCG        07/10/24 0926 Dark Yellow   Clear   Negative   Negative   Negative   Negative                    Follow Up   No follow-ups on file.  Patient was given instructions and counseling regarding his condition or for health  maintenance advice. Please see specific information pulled into the AVS if appropriate.     Antonette Montes MD

## 2024-07-16 ENCOUNTER — OFFICE VISIT (OUTPATIENT)
Dept: INTERNAL MEDICINE | Age: 59
End: 2024-07-16

## 2024-07-16 VITALS
HEIGHT: 71 IN | SYSTOLIC BLOOD PRESSURE: 150 MMHG | HEART RATE: 77 BPM | DIASTOLIC BLOOD PRESSURE: 90 MMHG | WEIGHT: 307.8 LBS | OXYGEN SATURATION: 94 % | TEMPERATURE: 98.4 F | BODY MASS INDEX: 43.09 KG/M2

## 2024-07-16 DIAGNOSIS — K21.9 GASTROESOPHAGEAL REFLUX DISEASE WITHOUT ESOPHAGITIS: ICD-10-CM

## 2024-07-16 DIAGNOSIS — E66.01 MORBID OBESITY DUE TO EXCESS CALORIES: ICD-10-CM

## 2024-07-16 DIAGNOSIS — E55.9 VITAMIN D DEFICIENCY: ICD-10-CM

## 2024-07-16 DIAGNOSIS — Z23 NEED FOR TDAP VACCINATION: ICD-10-CM

## 2024-07-16 DIAGNOSIS — E29.1 HYPOGONADISM IN MALE: ICD-10-CM

## 2024-07-16 DIAGNOSIS — E78.00 PURE HYPERCHOLESTEROLEMIA: ICD-10-CM

## 2024-07-16 DIAGNOSIS — I10 PRIMARY HYPERTENSION: Primary | ICD-10-CM

## 2024-07-16 PROCEDURE — 99214 OFFICE O/P EST MOD 30 MIN: CPT | Performed by: INTERNAL MEDICINE

## 2024-07-16 PROCEDURE — 90471 IMMUNIZATION ADMIN: CPT | Performed by: INTERNAL MEDICINE

## 2024-07-16 PROCEDURE — 90715 TDAP VACCINE 7 YRS/> IM: CPT | Performed by: INTERNAL MEDICINE

## 2024-07-16 RX ORDER — ATORVASTATIN CALCIUM 20 MG/1
20 TABLET, FILM COATED ORAL DAILY
Qty: 90 TABLET | Refills: 1 | Status: SHIPPED | OUTPATIENT
Start: 2024-07-16

## 2024-07-16 RX ORDER — LANSOPRAZOLE 30 MG/1
CAPSULE, DELAYED RELEASE ORAL
Qty: 30 CAPSULE | Refills: 2 | Status: SHIPPED | OUTPATIENT
Start: 2024-07-16

## 2024-07-16 RX ORDER — AMLODIPINE BESYLATE 5 MG/1
5 TABLET ORAL DAILY
Qty: 90 TABLET | Refills: 1 | Status: SHIPPED | OUTPATIENT
Start: 2024-07-16

## 2024-07-16 RX ORDER — FAMOTIDINE 40 MG/1
TABLET, FILM COATED ORAL
Qty: 30 TABLET | Refills: 2 | Status: SHIPPED | OUTPATIENT
Start: 2024-07-16

## 2024-07-16 RX ORDER — LISINOPRIL 40 MG/1
40 TABLET ORAL DAILY
Qty: 30 TABLET | Refills: 5 | Status: SHIPPED | OUTPATIENT
Start: 2024-07-16

## 2024-07-16 NOTE — ASSESSMENT & PLAN NOTE
Chol--much improved with LDL down to 90 from 155 HDL 36, total cholesterol 141-drawn Noreen 3 compared to March 7 values     plan-continue with atorvastatin 20 mg daily no myalgias  Goal is LDL less than 130 (ideally less than 70)  Recheck lipids at next visit

## 2024-07-16 NOTE — PROGRESS NOTES
CHIEF COMPLAINT  Kevin Jackson presents to [facility] for follow-up of Hypertension (Pt is a 59 yr old male presenting to Internal Medicine for a 5 week follow up on hypertension; Pt reports no further concerns and/or complaints. /).    HPI    58 yo pt here for f/u HTN- maxzide dose increased 6 weeks ago, needs refill on pantoprazole-GERD, low testosterone among others   and labs    Records reviewed, meds reviewed in detail, labs reviewed with patient.  Plan of care is as follows below.    HTN--BP =140-150s- at home    GERD-out of protonix for 2 weeks-still needing ibuprofen 200 mg 6-9 tabs daily (600 mg tid  otc) for diffuse joint pains throughout    Chol-stable with meds-LDL improved-now in 90s    low Testosterone/hypogonadism-getting TRT from Dr Jiang-records reviewed  Unable to get wegovy    Potassium equals 3.4-on KCL now    Older notes  6/3/24 visit   pt here for f/u of HTN,chol     HTN--on amlodipine-140/90 at clinic-- at home BP = up to 150s/.90s-increase maxzide -goal BP <130/80     Knee pain -getting euflexxa (hyaluronic acid injeciton)      Low testosterone-ffd by urology -now on 200 mg every 10 days     Chol-- , HDL 35, Tchol 212     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      Older notes     Patient Instructions 6/3/24   Increase maxzide 75/50 once daily - goal BP <130/80 monitor BP at home  Do labs today   F/u 4-6 weeks to check BP       6/3/24 visit  f/u of HTN,chol     HTN--on amlodipine-140/90 at clinic-- at home BP = up to 150s/.90s-increase maxzide -goal BP <130/80     Knee pain -getting euflexxa (hyaluronic acid injeciton)      Low testosterone-ffd by urology -now on 200 mg every 10 days     Chol-- , HDL 35, Tchol 212     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  Plan of care is as follows below.     Main concern elbow pain -arthritic- mild relief with ibuprofen and feels swollen up after eating-for past 6 months     Chol- BXM=755, HDL=35, dpip=144     HTN-taking meds-elevated-BP at home 149-150/90s     Hypogonadism-on chronic shots     Knee xrays-mod OA-saw Ortho Dr Barrios 3/19/24-for Visco injections  Pain is at-3-4/10     CXR-NAD     Patient Instructions 3/7/24   Do labs today   Xrays of knees and CXR  Do pfts  And low dose Ct chest  Refer to Ortho-Dr Tracy-for further evaluation of knee pain  Refer to endocrinologist for evaluation of hypogonadism and continuation of meds  Cut down portions  Monitor BP 2 x/day - 3-4 x/week and record goal is blood pressure less than 130/80  Start triamterene hydrochlorothiazide 1 tablet daily for blood pressure control  Follow-up in 3 weeks to check blood pressure and labs     3/7/24 visit  patient here to establish care-previous PCP was Dr Luevano- in [facility].     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       Current  "Outpatient Medications:     albuterol sulfate HFA (ProAir HFA) 108 (90 Base) MCG/ACT inhaler, Inhale 2 puffs Every 4 (Four) Hours As Needed for Wheezing., Disp: 18 g, Rfl: 5    amLODIPine (NORVASC) 5 MG tablet, Take 1 tablet by mouth Daily., Disp: 90 tablet, Rfl: 1    atorvastatin (LIPITOR) 20 MG tablet, Take 1 tablet by mouth Daily., Disp: 90 tablet, Rfl: 1    escitalopram (LEXAPRO) 20 MG tablet, Take 1 tablet by mouth Daily., Disp: 90 tablet, Rfl: 1    potassium chloride (KLOR-CON M20) 20 MEQ CR tablet, Take 1 tablet by mouth 2 (Two) Times a Day., Disp: 30 tablet, Rfl: 5    Testosterone Cypionate 200 MG/ML solution, Inject 0.5 mL as directed Every 7 (Seven) Days for 180 days., Disp: 10 mL, Rfl: 1    tiotropium bromide monohydrate (Spiriva Respimat) 2.5 MCG/ACT aerosol solution inhaler, Inhale 2 puffs Daily., Disp: 4 g, Rfl: 2    traZODone (DESYREL) 100 MG tablet, Take 1 tablet by mouth Every Night., Disp: 90 tablet, Rfl: 1    triamterene-hydrochlorothiazide (Maxzide) 75-50 MG per tablet, Take 1 tablet by mouth Daily., Disp: 90 tablet, Rfl: 1    famotidine (PEPCID) 40 MG tablet, Take one tab po every other day and alternate with lansoprazole/prevacid every other day, Disp: 30 tablet, Rfl: 2    lansoprazole (Prevacid) 30 MG capsule, One tab po every other day alternate with pepcid/famotidine, Disp: 30 capsule, Rfl: 2    lisinopril (PRINIVIL,ZESTRIL) 40 MG tablet, Take 1 tablet by mouth Daily., Disp: 30 tablet, Rfl: 5   PFSH reviewed.      OBJECTIVE  Vital Signs  Vitals:    07/16/24 0841 07/16/24 0905   BP: 149/78 150/90   BP Location: Left arm Left arm   Patient Position: Sitting Sitting   Cuff Size: Large Adult Large Adult   Pulse: 77    Temp: 98.4 °F (36.9 °C)    TempSrc: Infrared    SpO2: 94%    Weight: (!) 140 kg (307 lb 12.8 oz)    Height: 180.3 cm (70.98\")       Body mass index is 42.95 kg/m².    Physical Exam  Vitals and nursing note reviewed.   Constitutional:       Appearance: Normal appearance.   HENT:     " " Head: Normocephalic and atraumatic.      Nose: Nose normal.   Eyes:      Extraocular Movements: Extraocular movements intact.      Pupils: Pupils are equal, round, and reactive to light.   Cardiovascular:      Rate and Rhythm: Normal rate and regular rhythm.   Pulmonary:      Effort: Pulmonary effort is normal.      Breath sounds: Normal breath sounds.   Abdominal:      General: Abdomen is flat.      Palpations: Abdomen is soft.   Musculoskeletal:      Cervical back: Normal range of motion and neck supple.   Skin:     General: Skin is warm and dry.   Neurological:      General: No focal deficit present.      Mental Status: He is alert and oriented to person, place, and time.   Psychiatric:         Mood and Affect: Mood normal.         Behavior: Behavior normal.          RESULTS REVIEW  No results found for: \"PROBNP\", \"BNP\"  CMP          3/7/2024    14:21 6/3/2024    08:35   CMP   Glucose 83  91    BUN 12  14    Creatinine 1.01  1.09    EGFR 87.3  79.2    Sodium 136  136    Potassium 3.8  3.4    Chloride 101  98    Calcium 9.1  9.3    Total Protein 7.2  7.0    Albumin 4.3  4.1    Globulin 2.9  2.9    Total Bilirubin 0.5  0.5    Alkaline Phosphatase 66  78    AST (SGOT) 26  23    ALT (SGPT) 25  24    Albumin/Globulin Ratio 1.5  1.4    BUN/Creatinine Ratio 11.9  12.8    Anion Gap 12.5  13.1      CBC w/diff          3/7/2024    14:21 6/3/2024    08:35   CBC w/Diff   WBC 8.25  7.30    RBC 5.57  6.26    Hemoglobin 15.3  17.4    Hematocrit 47.0  52.6    MCV 84.4  84.0    MCH 27.5  27.8    MCHC 32.6  33.1    RDW 13.4  13.8    Platelets 222  274    Neutrophil Rel % 71.4  67.2    Immature Granulocyte Rel % 0.5  0.4    Lymphocyte Rel % 20.1  22.2    Monocyte Rel % 6.1  6.6    Eosinophil Rel % 1.2  2.6    Basophil Rel % 0.7  1.0       Lipid Panel          3/7/2024    14:21 6/3/2024    08:35   Lipid Panel   Total Cholesterol 212  141    Triglycerides 123  79    HDL Cholesterol 35  36    VLDL Cholesterol 22  15    LDL " Cholesterol  155  90    LDL/HDL Ratio 4.35  2.48       Lab Results   Component Value Date    TSH 2.090 03/07/2024      Lab Results   Component Value Date    FREET4 1.01 03/07/2024         Lab Results   Component Value Date    YFGYUFJN48 739 06/03/2024    LMHO98DQ 36.2 06/03/2024    MG 2.0 03/07/2024     PSA          6/3/2024    08:35   PSA   PSA 0.864       CT Chest Low Dose Cancer Screening WO    Result Date: 4/26/2024  Impression: 1. No suspicious pulmonary nodule. Recommend continued low-dose lung CT follow-up in 1 year. 2. Two small peripheral bronchoceles in the right upper lobe as above.  Recommendation: Continue annual screening with LDCT  Lung Rads Assessment: Lung-RADS L2 - Benign appearance or <1% chance of malignancy.    Electronically Signed By-Kristopher Wynn MD On:4/26/2024 1:04 PM                 ASSESSMENT & PLAN  Diagnoses and all orders for this visit:    1. Primary hypertension (Primary)  Assessment & Plan:  Pretension-still not controlled not controlled on maximum dose of Maxide  Blood pressure at clinic = 150/90  at home BP = up to 150s/.90s-    Plan -add lisinopril 40 mg 1 daily  Continue with amlodipine 5 mg 1 daily and Maxide to 75-50 mg 1 tablet daily  Follow a low-salt diet-  Cut back on portions  Goal blood pressure is less than 130/80 continue to monitor at home  Follow-up in 2 to 3 months to check blood pressure    Orders:  -     lisinopril (PRINIVIL,ZESTRIL) 40 MG tablet; Take 1 tablet by mouth Daily.  Dispense: 30 tablet; Refill: 5  -     amLODIPine (NORVASC) 5 MG tablet; Take 1 tablet by mouth Daily.  Dispense: 90 tablet; Refill: 1  -     Comprehensive Metabolic Panel; Future  -     Vitamin D,25-Hydroxy; Future    2. Gastroesophageal reflux disease without esophagitis  Assessment & Plan:  GERD not controlled with PPI has been out of the pantoprazole for 2 weeks has been taking over-the-counter Prevacid which he states has been helping.  However still taking ibuprofen up to 600 mg  over-the-counter 3 times a day for his chronic joint pains c-already using diclofenac gel as needed-but did not help    Plan-  Try to alternate Prevacid 40 mg every other day with Pepcid 40 mg every other day   Discussed long-term side effects of chronic PPI use such as increased risk of osteoporosis and recurrent pneumonias along with B12 and magnesium deficiency  Try to cut back on ibuprofen use preferably stop and try naproxen 500 mg twice daily patient wants to get it over-the-counter    Orders:  -     lansoprazole (Prevacid) 30 MG capsule; One tab po every other day alternate with pepcid/famotidine  Dispense: 30 capsule; Refill: 2  -     famotidine (PEPCID) 40 MG tablet; Take one tab po every other day and alternate with lansoprazole/prevacid every other day  Dispense: 30 tablet; Refill: 2    3. Pure hypercholesterolemia  Assessment & Plan:  Chol--much improved with LDL down to 90 from 155 HDL 36, total cholesterol 141-drawn Noreen 3 compared to March 7 values     plan-continue with atorvastatin 20 mg daily no myalgias  Goal is LDL less than 130 (ideally less than 70)  Recheck lipids at next visit      Orders:  -     atorvastatin (LIPITOR) 20 MG tablet; Take 1 tablet by mouth Daily.  Dispense: 90 tablet; Refill: 1    4. Vitamin D deficiency  -     Comprehensive Metabolic Panel; Future  -     Vitamin D,25-Hydroxy; Future    5. Hypogonadism in male  Overview:  getting testosterone snots every every 2 weeks for past 5-6 yrs-by provider Dr Luevano      6. Morbid obesity due to excess calories  Comments:  Unable to get Wegovy-follow low-carb diet try to lose 5 to 10 pounds by next visit walk/exercise 30 minutes daily    7. Need for Tdap vaccination  -     Tdap Vaccine => 6yo IM (BOOSTRIX/ADACEL)                 Patient Instructions   Try to stop ibuprofen and use naproxen 500 mg twice a day-because of increased risk of gastritis and stomach ulcers  Try taking Prevacid alternating with Pepcid/famotidine every other  day  Start lisinopril 40 mg once daily for blood pressure control goal is less than 130/80  Continue with atorvastatin LDL is closer to 70  F/u in 3 months with labs prior     FOLLOW UP  Return in about 3 months (around 10/16/2024) for Recheck.    Patient was given instructions and counseling regarding his condition or for health maintenance advice. Please see specific information pulled into the AVS if appropriate.

## 2024-07-16 NOTE — PATIENT INSTRUCTIONS
Try to stop ibuprofen and use naproxen 500 mg twice a day-because of increased risk of gastritis and stomach ulcers  Try taking Prevacid alternating with Pepcid/famotidine every other day  Start lisinopril 40 mg once daily for blood pressure control goal is less than 130/80  Continue with atorvastatin LDL is closer to 70  F/u in 3 months with labs prior

## 2024-07-16 NOTE — ASSESSMENT & PLAN NOTE
GERD not controlled with PPI has been out of the pantoprazole for 2 weeks has been taking over-the-counter Prevacid which he states has been helping.  However still taking ibuprofen up to 600 mg over-the-counter 3 times a day for his chronic joint pains c-already using diclofenac gel as needed-but did not help    Plan-  Try to alternate Prevacid 40 mg every other day with Pepcid 40 mg every other day   Discussed long-term side effects of chronic PPI use such as increased risk of osteoporosis and recurrent pneumonias along with B12 and magnesium deficiency  Try to cut back on ibuprofen use preferably stop and try naproxen 500 mg twice daily patient wants to get it over-the-counter

## 2024-07-16 NOTE — ASSESSMENT & PLAN NOTE
Pretension-still not controlled not controlled on maximum dose of Maxide  Blood pressure at clinic = 150/90  at home BP = up to 150s/.90s-    Plan -add lisinopril 40 mg 1 daily  Continue with amlodipine 5 mg 1 daily and Maxide to 75-50 mg 1 tablet daily  Follow a low-salt diet-  Cut back on portions  Goal blood pressure is less than 130/80 continue to monitor at home  Follow-up in 2 to 3 months to check blood pressure

## 2024-08-27 DIAGNOSIS — R06.02 SHORTNESS OF BREATH: ICD-10-CM

## 2024-08-27 DIAGNOSIS — F17.201 TOBACCO DEPENDENCE IN REMISSION: ICD-10-CM

## 2024-08-27 RX ORDER — TIOTROPIUM BROMIDE INHALATION SPRAY 3.12 UG/1
2 SPRAY, METERED RESPIRATORY (INHALATION)
Qty: 4 G | Refills: 2 | Status: SHIPPED | OUTPATIENT
Start: 2024-08-27

## 2024-08-27 NOTE — TELEPHONE ENCOUNTER
Caller: Kevin Jackson    Relationship: Self    Best call back number: [phone]    Requested Prescriptions:   Requested Prescriptions     Pending Prescriptions Disp Refills    tiotropium bromide monohydrate (Spiriva Respimat) 2.5 MCG/ACT aerosol solution inhaler 4 g 2     Sig: Inhale 2 puffs Daily.        Pharmacy where request should be sent: [place] [phone] [place] [phone]      Last office visit with prescribing clinician: 7/16/2024   Last telemedicine visit with prescribing clinician: Visit date not found   Next office visit with prescribing clinician: 10/16/2024     Additional details provided by patient:     Does the patient have less than a 3 day supply:  [x] Yes  [] No      Mele Watson Rep   08/27/24 13:59 EDT

## 2024-09-13 ENCOUNTER — LAB (OUTPATIENT)
Dept: LAB | Facility: HOSPITAL | Age: 59
End: 2024-09-13
Payer: COMMERCIAL

## 2024-09-13 DIAGNOSIS — Z79.890 LONG-TERM CURRENT USE OF TESTOSTERONE REPLACEMENT THERAPY: ICD-10-CM

## 2024-09-13 DIAGNOSIS — K21.9 GASTROESOPHAGEAL REFLUX DISEASE WITHOUT ESOPHAGITIS: ICD-10-CM

## 2024-09-13 DIAGNOSIS — E55.9 VITAMIN D DEFICIENCY: ICD-10-CM

## 2024-09-13 DIAGNOSIS — M17.0 BILATERAL PRIMARY OSTEOARTHRITIS OF KNEE: ICD-10-CM

## 2024-09-13 DIAGNOSIS — R79.89 LOW TESTOSTERONE IN MALE: ICD-10-CM

## 2024-09-13 DIAGNOSIS — I10 PRIMARY HYPERTENSION: ICD-10-CM

## 2024-09-13 DIAGNOSIS — E78.00 PURE HYPERCHOLESTEROLEMIA: ICD-10-CM

## 2024-09-13 DIAGNOSIS — N40.0 BENIGN PROSTATIC HYPERPLASIA WITHOUT LOWER URINARY TRACT SYMPTOMS: ICD-10-CM

## 2024-09-13 DIAGNOSIS — F17.201 TOBACCO DEPENDENCE IN REMISSION: ICD-10-CM

## 2024-09-13 DIAGNOSIS — E66.01 MORBID OBESITY DUE TO EXCESS CALORIES: ICD-10-CM

## 2024-09-13 DIAGNOSIS — R79.89 LOW TESTOSTERONE: ICD-10-CM

## 2024-09-13 LAB
25(OH)D3 SERPL-MCNC: 44.1 NG/ML (ref 30–100)
ALBUMIN SERPL-MCNC: 4.1 G/DL (ref 3.5–5.2)
ALBUMIN/GLOB SERPL: 1.4 G/DL
ALP SERPL-CCNC: 72 U/L (ref 39–117)
ALT SERPL W P-5'-P-CCNC: 24 U/L (ref 1–41)
ANION GAP SERPL CALCULATED.3IONS-SCNC: 11.6 MMOL/L (ref 5–15)
AST SERPL-CCNC: 25 U/L (ref 1–40)
BASOPHILS # BLD AUTO: 0.07 10*3/MM3 (ref 0–0.2)
BASOPHILS NFR BLD AUTO: 1.1 % (ref 0–1.5)
BILIRUB SERPL-MCNC: 0.5 MG/DL (ref 0–1.2)
BUN SERPL-MCNC: 31 MG/DL (ref 6–20)
BUN/CREAT SERPL: 20.3 (ref 7–25)
CALCIUM SPEC-SCNC: 9 MG/DL (ref 8.6–10.5)
CHLORIDE SERPL-SCNC: 100 MMOL/L (ref 98–107)
CO2 SERPL-SCNC: 22.4 MMOL/L (ref 22–29)
CREAT SERPL-MCNC: 1.53 MG/DL (ref 0.76–1.27)
DEPRECATED RDW RBC AUTO: 41.2 FL (ref 37–54)
EGFRCR SERPLBLD CKD-EPI 2021: 52.7 ML/MIN/1.73
EOSINOPHIL # BLD AUTO: 0.14 10*3/MM3 (ref 0–0.4)
EOSINOPHIL NFR BLD AUTO: 2.1 % (ref 0.3–6.2)
ERYTHROCYTE [DISTWIDTH] IN BLOOD BY AUTOMATED COUNT: 13.8 % (ref 12.3–15.4)
FSH SERPL-ACNC: <0.3 MIU/ML
GLOBULIN UR ELPH-MCNC: 2.9 GM/DL
GLUCOSE SERPL-MCNC: 101 MG/DL (ref 65–99)
HCT VFR BLD AUTO: 45 % (ref 37.5–51)
HGB BLD-MCNC: 15.3 G/DL (ref 13–17.7)
IMM GRANULOCYTES # BLD AUTO: 0.02 10*3/MM3 (ref 0–0.05)
IMM GRANULOCYTES NFR BLD AUTO: 0.3 % (ref 0–0.5)
LH SERPL-ACNC: <0.3 MIU/ML
LYMPHOCYTES # BLD AUTO: 1.54 10*3/MM3 (ref 0.7–3.1)
LYMPHOCYTES NFR BLD AUTO: 23.6 % (ref 19.6–45.3)
MAGNESIUM SERPL-MCNC: 2.1 MG/DL (ref 1.6–2.6)
MCH RBC QN AUTO: 28 PG (ref 26.6–33)
MCHC RBC AUTO-ENTMCNC: 34 G/DL (ref 31.5–35.7)
MCV RBC AUTO: 82.3 FL (ref 79–97)
MONOCYTES # BLD AUTO: 0.54 10*3/MM3 (ref 0.1–0.9)
MONOCYTES NFR BLD AUTO: 8.3 % (ref 5–12)
NEUTROPHILS NFR BLD AUTO: 4.21 10*3/MM3 (ref 1.7–7)
NEUTROPHILS NFR BLD AUTO: 64.6 % (ref 42.7–76)
NRBC BLD AUTO-RTO: 0 /100 WBC (ref 0–0.2)
PLATELET # BLD AUTO: 218 10*3/MM3 (ref 140–450)
PMV BLD AUTO: 9.8 FL (ref 6–12)
POTASSIUM SERPL-SCNC: 4.4 MMOL/L (ref 3.5–5.2)
PROLACTIN SERPL-MCNC: 16 NG/ML (ref 4.04–15.2)
PROT SERPL-MCNC: 7 G/DL (ref 6–8.5)
PSA SERPL-MCNC: 0.96 NG/ML (ref 0–4)
RBC # BLD AUTO: 5.47 10*6/MM3 (ref 4.14–5.8)
SODIUM SERPL-SCNC: 134 MMOL/L (ref 136–145)
T3FREE SERPL-MCNC: 3.99 PG/ML (ref 2–4.4)
T4 FREE SERPL-MCNC: 1.07 NG/DL (ref 0.92–1.68)
TSH SERPL DL<=0.05 MIU/L-ACNC: 3.05 UIU/ML (ref 0.27–4.2)
WBC NRBC COR # BLD AUTO: 6.52 10*3/MM3 (ref 3.4–10.8)

## 2024-09-13 PROCEDURE — 84146 ASSAY OF PROLACTIN: CPT

## 2024-09-13 PROCEDURE — 84153 ASSAY OF PSA TOTAL: CPT

## 2024-09-13 PROCEDURE — 85025 COMPLETE CBC W/AUTO DIFF WBC: CPT

## 2024-09-13 PROCEDURE — 84439 ASSAY OF FREE THYROXINE: CPT

## 2024-09-13 PROCEDURE — 36415 COLL VENOUS BLD VENIPUNCTURE: CPT

## 2024-09-13 PROCEDURE — 84403 ASSAY OF TOTAL TESTOSTERONE: CPT

## 2024-09-13 PROCEDURE — 84270 ASSAY OF SEX HORMONE GLOBUL: CPT

## 2024-09-13 PROCEDURE — 82681 ASSAY DIR MEAS FR ESTRADIOL: CPT

## 2024-09-13 PROCEDURE — 84481 FREE ASSAY (FT-3): CPT

## 2024-09-13 PROCEDURE — 82306 VITAMIN D 25 HYDROXY: CPT

## 2024-09-13 PROCEDURE — 83001 ASSAY OF GONADOTROPIN (FSH): CPT

## 2024-09-13 PROCEDURE — 82670 ASSAY OF TOTAL ESTRADIOL: CPT

## 2024-09-13 PROCEDURE — 83735 ASSAY OF MAGNESIUM: CPT

## 2024-09-13 PROCEDURE — 83002 ASSAY OF GONADOTROPIN (LH): CPT

## 2024-09-13 PROCEDURE — 84443 ASSAY THYROID STIM HORMONE: CPT

## 2024-09-13 PROCEDURE — 84402 ASSAY OF FREE TESTOSTERONE: CPT

## 2024-09-13 PROCEDURE — 80053 COMPREHEN METABOLIC PANEL: CPT

## 2024-09-14 LAB — SHBG SERPL-SCNC: 28 NMOL/L (ref 19.3–76.4)

## 2024-09-16 ENCOUNTER — TELEPHONE (OUTPATIENT)
Dept: UROLOGY | Facility: CLINIC | Age: 59
End: 2024-09-16
Payer: COMMERCIAL

## 2024-09-16 DIAGNOSIS — E29.1 HYPOGONADISM IN MALE: ICD-10-CM

## 2024-09-16 DIAGNOSIS — R79.89 ELEVATED SERUM CREATININE: ICD-10-CM

## 2024-09-16 DIAGNOSIS — R79.89 PROLACTIN INCREASED: Primary | ICD-10-CM

## 2024-09-20 LAB
TESTOST FREE SERPL-MCNC: 18 PG/ML (ref 7.2–24)
TESTOST SERPL-MCNC: 755.8 NG/DL (ref 264–916)

## 2024-09-23 LAB
ESTRADIOL FREE MFR SERPL: 2.6 %
ESTRADIOL FREE SERPL-MCNC: 1.2 PG/ML
ESTRADIOL SERPL HS-MCNC: 47 PG/ML

## 2024-10-03 ENCOUNTER — TELEPHONE (OUTPATIENT)
Dept: ORTHOPEDIC SURGERY | Facility: CLINIC | Age: 59
End: 2024-10-03
Payer: COMMERCIAL

## 2024-10-03 NOTE — TELEPHONE ENCOUNTER
Caller: Kevin Jackson    Relationship to patient: Self    Best call back number: [phone]    Chief complaint: BILATERAL KNEE PAIN    Type of visit: EUFLEXXA INJ    Requested date: ASAP

## 2024-10-15 ENCOUNTER — TELEPHONE (OUTPATIENT)
Dept: ORTHOPEDIC SURGERY | Facility: CLINIC | Age: 59
End: 2024-10-15
Payer: COMMERCIAL

## 2024-10-15 NOTE — TELEPHONE ENCOUNTER
----- Message from Erika FRENCH sent at 10/11/2024  9:04 AM EDT -----  No PA required for bilateral knee Euflexxa.  Okay to schedule when appropriate.

## 2024-10-15 NOTE — TELEPHONE ENCOUNTER
APPT: [place] KNEE EUFLEXXA INJ WITH RAFFY AT [facility]    11-11 AT 8:30 AM  11-18 AT 9:15 AM  11-25 AT 8:00 AM    LAST [place] KNEE EUFLEXXA INJ: 5-9    [employer] = NO PA REQ

## 2024-10-15 NOTE — TELEPHONE ENCOUNTER
Caller: BRENNON    Relationship to patient: SELF    Best call back number: [phone]    Patient is needing: PATIENT CALLED TO CHECK STATUS OF GEL INJECTION AND GIVE NUMBERFOR AUTHORIZATION [phone]

## 2024-10-16 ENCOUNTER — OFFICE VISIT (OUTPATIENT)
Dept: INTERNAL MEDICINE | Age: 59
End: 2024-10-16
Payer: COMMERCIAL

## 2024-10-16 ENCOUNTER — LAB (OUTPATIENT)
Dept: LAB | Facility: HOSPITAL | Age: 59
End: 2024-10-16
Payer: COMMERCIAL

## 2024-10-16 VITALS
RESPIRATION RATE: 18 BRPM | DIASTOLIC BLOOD PRESSURE: 82 MMHG | SYSTOLIC BLOOD PRESSURE: 128 MMHG | TEMPERATURE: 98.4 F | HEART RATE: 88 BPM | WEIGHT: 306.6 LBS | HEIGHT: 71 IN | BODY MASS INDEX: 42.92 KG/M2 | OXYGEN SATURATION: 92 %

## 2024-10-16 DIAGNOSIS — G89.29 CHRONIC PAIN OF BOTH KNEES: ICD-10-CM

## 2024-10-16 DIAGNOSIS — F17.201 TOBACCO DEPENDENCE IN REMISSION: ICD-10-CM

## 2024-10-16 DIAGNOSIS — I10 PRIMARY HYPERTENSION: Primary | ICD-10-CM

## 2024-10-16 DIAGNOSIS — F32.9 REACTIVE DEPRESSION: ICD-10-CM

## 2024-10-16 DIAGNOSIS — J43.2 CENTRILOBULAR EMPHYSEMA: ICD-10-CM

## 2024-10-16 DIAGNOSIS — E66.01 MORBID OBESITY DUE TO EXCESS CALORIES: ICD-10-CM

## 2024-10-16 DIAGNOSIS — M25.561 CHRONIC PAIN OF BOTH KNEES: ICD-10-CM

## 2024-10-16 DIAGNOSIS — E78.00 PURE HYPERCHOLESTEROLEMIA: ICD-10-CM

## 2024-10-16 DIAGNOSIS — K21.9 GASTROESOPHAGEAL REFLUX DISEASE WITHOUT ESOPHAGITIS: ICD-10-CM

## 2024-10-16 DIAGNOSIS — M25.562 CHRONIC PAIN OF BOTH KNEES: ICD-10-CM

## 2024-10-16 DIAGNOSIS — E55.9 VITAMIN D DEFICIENCY: ICD-10-CM

## 2024-10-16 DIAGNOSIS — R79.89 LOW TESTOSTERONE IN MALE: ICD-10-CM

## 2024-10-16 LAB
ALBUMIN UR-MCNC: <1.2 MG/DL
ANION GAP SERPL CALCULATED.3IONS-SCNC: 8.9 MMOL/L (ref 5–15)
BUN SERPL-MCNC: 21 MG/DL (ref 6–20)
BUN/CREAT SERPL: 17.6 (ref 7–25)
CALCIUM SPEC-SCNC: 9.4 MG/DL (ref 8.6–10.5)
CHLORIDE SERPL-SCNC: 101 MMOL/L (ref 98–107)
CO2 SERPL-SCNC: 23.1 MMOL/L (ref 22–29)
CREAT SERPL-MCNC: 1.19 MG/DL (ref 0.76–1.27)
EGFRCR SERPLBLD CKD-EPI 2021: 71.2 ML/MIN/1.73
GLUCOSE SERPL-MCNC: 104 MG/DL (ref 65–99)
POTASSIUM SERPL-SCNC: 4.4 MMOL/L (ref 3.5–5.2)
SODIUM SERPL-SCNC: 133 MMOL/L (ref 136–145)

## 2024-10-16 PROCEDURE — 82043 UR ALBUMIN QUANTITATIVE: CPT | Performed by: INTERNAL MEDICINE

## 2024-10-16 PROCEDURE — 80048 BASIC METABOLIC PNL TOTAL CA: CPT | Performed by: INTERNAL MEDICINE

## 2024-10-16 PROCEDURE — 36415 COLL VENOUS BLD VENIPUNCTURE: CPT | Performed by: INTERNAL MEDICINE

## 2024-10-16 PROCEDURE — 90656 IIV3 VACC NO PRSV 0.5 ML IM: CPT | Performed by: INTERNAL MEDICINE

## 2024-10-16 PROCEDURE — 99214 OFFICE O/P EST MOD 30 MIN: CPT | Performed by: INTERNAL MEDICINE

## 2024-10-16 PROCEDURE — 90471 IMMUNIZATION ADMIN: CPT | Performed by: INTERNAL MEDICINE

## 2024-10-16 RX ORDER — ESCITALOPRAM OXALATE 10 MG/1
10 TABLET ORAL DAILY
Qty: 90 TABLET | Refills: 1 | Status: SHIPPED | OUTPATIENT
Start: 2024-10-16

## 2024-10-16 RX ORDER — TRAZODONE HYDROCHLORIDE 100 MG/1
100 TABLET ORAL NIGHTLY
Qty: 90 TABLET | Refills: 1 | Status: SHIPPED | OUTPATIENT
Start: 2024-10-16

## 2024-10-16 RX ORDER — VITAMIN B COMPLEX
TABLET ORAL
Qty: 90 EACH | Refills: 3 | Status: SHIPPED | OUTPATIENT
Start: 2024-10-16

## 2024-10-16 RX ORDER — TIOTROPIUM BROMIDE INHALATION SPRAY 3.12 UG/1
2 SPRAY, METERED RESPIRATORY (INHALATION)
Qty: 12 G | Refills: 3 | Status: SHIPPED | OUTPATIENT
Start: 2024-10-16

## 2024-10-16 NOTE — PROGRESS NOTES
Answers submitted by the patient for this visit:  Primary Reason for Visit (Submitted on 10/9/2024)  What is the primary reason for your visit?: High Blood Pressure  High Blood Pressure Questionnaire (Submitted on 10/9/2024)  Chief Complaint: Hypertension  Chronicity: chronic  Onset: more than 1 month ago  Progression since onset: improved  anxiety: No  blurred vision: No  chest pain: No  headaches: No  malaise/fatigue: No  orthopnea: No  palpitations: No  peripheral edema: Yes  shortness of breath: Yes  Compliance problems: no compliance problems  CHIEF COMPLAINT  Kevin Jackson presents to [facility] for follow-up of Hypertension (59 year old male here today for his 3 month follow up on his hypertension. States overall its doing better and so is he. ).    HPI  59-year-old patient with hypertension, cholesterol  low testosterone on HRT here for 3-month follow-up and lab review    Records reviewed, meds reviewed in detail, labs reviewed with patient.  Plan of care is as follows below.  Creatinine equals 1.53 BUN elevated 31 GFR 52, LDL 90 HDL 36     Was taking one tab Maxide and causing dehydration  PMFSH-Reviewed  ROS-no headaches    Current Outpatient Medications:     amLODIPine (NORVASC) 5 MG tablet, Take 1 tablet by mouth Daily., Disp: 90 tablet, Rfl: 1    atorvastatin (LIPITOR) 20 MG tablet, Take 1 tablet by mouth Daily., Disp: 90 tablet, Rfl: 1    lisinopril (PRINIVIL,ZESTRIL) 40 MG tablet, Take 1 tablet by mouth Daily., Disp: 30 tablet, Rfl: 5    potassium chloride (KLOR-CON M20) 20 MEQ CR tablet, Take 1 tablet by mouth 2 (Two) Times a Day., Disp: 30 tablet, Rfl: 5    Testosterone Cypionate 200 MG/ML solution, Inject 0.5 mL as directed Every 7 (Seven) Days for 180 days., Disp: 10 mL, Rfl: 1    tiotropium bromide monohydrate (Spiriva Respimat) 2.5 MCG/ACT aerosol solution inhaler, Inhale 2 puffs Daily., Disp: 12 g, Rfl: 3    traZODone (DESYREL) 100 MG tablet, Take 1  "tablet by mouth Every Night., Disp: 90 tablet, Rfl: 1    triamterene-hydrochlorothiazide (Maxzide) 75-50 MG per tablet, Take 1 tablet by mouth Daily. (Patient taking differently: Take 0.5 tablets by mouth Daily.), Disp: 90 tablet, Rfl: 1    Coenzyme Q10 (CoQ10) 100 MG capsule, One tab po daily, Disp: 90 each, Rfl: 3    escitalopram (Lexapro) 10 MG tablet, Take 1 tablet by mouth Daily., Disp: 90 tablet, Rfl: 1   PFSH reviewed.      OBJECTIVE  Vital Signs  Vitals:    10/16/24 0851 10/16/24 0931   BP: 138/82 128/82   BP Location: Left arm Left arm   Patient Position: Sitting Sitting   Cuff Size:  Large Adult   Pulse: 88    Resp: 18    Temp: 98.4 °F (36.9 °C)    TempSrc: Temporal    SpO2: 92%    Weight: (!) 139 kg (306 lb 9.6 oz)    Height: 180.3 cm (70.98\")       Body mass index is 42.78 kg/m².    Physical Exam  Vitals and nursing note reviewed.   Constitutional:       Appearance: Normal appearance.   HENT:      Head: Normocephalic and atraumatic.      Nose: Nose normal.   Eyes:      Extraocular Movements: Extraocular movements intact.      Pupils: Pupils are equal, round, and reactive to light.   Cardiovascular:      Rate and Rhythm: Normal rate and regular rhythm.   Pulmonary:      Effort: Pulmonary effort is normal.      Breath sounds: Normal breath sounds.   Abdominal:      General: Abdomen is flat.      Palpations: Abdomen is soft.   Musculoskeletal:      Cervical back: Normal range of motion and neck supple.   Skin:     General: Skin is warm and dry.   Neurological:      General: No focal deficit present.      Mental Status: He is alert and oriented to person, place, and time.   Psychiatric:         Mood and Affect: Mood normal.         Behavior: Behavior normal.          RESULTS REVIEW  No results found for: \"PROBNP\", \"BNP\"  CMP          6/3/2024    08:35 9/13/2024    08:58 10/16/2024    10:22   CMP   Glucose 91  101  104    BUN 14  31  21    Creatinine 1.09  1.53  1.19    EGFR 79.2  52.7  71.2    Sodium 136  " 134  133    Potassium 3.4  4.4  4.4    Chloride 98  100  101    Calcium 9.3  9.0  9.4    Total Protein 7.0  7.0     Albumin 4.1  4.1     Globulin 2.9  2.9     Total Bilirubin 0.5  0.5     Alkaline Phosphatase 78  72     AST (SGOT) 23  25     ALT (SGPT) 24  24     Albumin/Globulin Ratio 1.4  1.4     BUN/Creatinine Ratio 12.8  20.3  17.6    Anion Gap 13.1  11.6  8.9      CBC w/diff          3/7/2024    14:21 6/3/2024    08:35 9/13/2024    08:58   CBC w/Diff   WBC 8.25  7.30  6.52    RBC 5.57  6.26  5.47    Hemoglobin 15.3  17.4  15.3    Hematocrit 47.0  52.6  45.0    MCV 84.4  84.0  82.3    MCH 27.5  27.8  28.0    MCHC 32.6  33.1  34.0    RDW 13.4  13.8  13.8    Platelets 222  274  218    Neutrophil Rel % 71.4  67.2  64.6    Immature Granulocyte Rel % 0.5  0.4  0.3    Lymphocyte Rel % 20.1  22.2  23.6    Monocyte Rel % 6.1  6.6  8.3    Eosinophil Rel % 1.2  2.6  2.1    Basophil Rel % 0.7  1.0  1.1       Lipid Panel          3/7/2024    14:21 6/3/2024    08:35   Lipid Panel   Total Cholesterol 212  141    Triglycerides 123  79    HDL Cholesterol 35  36    VLDL Cholesterol 22  15    LDL Cholesterol  155  90    LDL/HDL Ratio 4.35  2.48       Lab Results   Component Value Date    TSH 3.050 09/13/2024    TSH 2.090 03/07/2024      Lab Results   Component Value Date    FREET4 1.07 09/13/2024    FREET4 1.01 03/07/2024         Lab Results   Component Value Date    BBISVQDC07 739 06/03/2024    GWUA46PG 44.1 09/13/2024    MG 2.1 09/13/2024     PSA          6/3/2024    08:35 9/13/2024    08:58   PSA   PSA 0.864  0.963       No Images in the past 120 days found..             ASSESSMENT & PLAN  Diagnoses and all orders for this visit:    1. Primary hypertension (Primary)  Assessment & Plan:  Hypertension-better controlled -BP =128/82    Plan -Cont with  lisinopril 40 mg 1 daily  amlodipine 5 mg 1 daily and Maxide 75-50 mg 1 tablet daily  Follow a low-salt diet-  Cut back on portions  Goal blood pressure is less than 130/80 continue  to monitor at home      Orders:  -     Fluzone >6mos ([id number])  -     Cancel: Microalbumin / Creatinine Urine Ratio - Urine, Clean Catch; Future  -     Cancel: CBC w AUTO Differential; Future  -     Cancel: Comprehensive metabolic panel; Future  -     Cancel: Lipid panel; Future  -     Cancel: TSH+Free T4; Future  -     Cancel: Testosterone; Future  -     traZODone (DESYREL) 100 MG tablet; Take 1 tablet by mouth Every Night.  Dispense: 90 tablet; Refill: 1  -     tiotropium bromide monohydrate (Spiriva Respimat) 2.5 MCG/ACT aerosol solution inhaler; Inhale 2 puffs Daily.  Dispense: 12 g; Refill: 3  -     escitalopram (Lexapro) 10 MG tablet; Take 1 tablet by mouth Daily.  Dispense: 90 tablet; Refill: 1  -     Basic Metabolic Panel; Future  -     Comprehensive Metabolic Panel; Future  -     Lipid Panel; Future  -     TSH+Free T4; Future  -     T3, Free; Future  -     Vitamin B12; Future  -     Folate; Future  -     Magnesium; Future  -     Vitamin D,25-Hydroxy; Future  -     CBC & Differential; Future  -     MicroAlbumin, Urine, Random - Urine, Clean Catch; Future  -     Basic Metabolic Panel  -     MicroAlbumin, Urine, Random - Urine, Clean Catch  -     Cancel: MicroAlbumin, Urine, Random - Urine, Clean Catch    2. Pure hypercholesterolemia  Assessment & Plan:  Chol--much improved with LDL down to 90 from 155 HDL 36, total cholesterol 141-drawn Noreen 3 compared to March 7 values     plan-continue with atorvastatin 20 mg daily no myalgias  Goal is LDL less than 130 (ideally less than 70)  Recheck lipids at next visit      Orders:  -     Fluzone >6mos ([id number])  -     Cancel: Microalbumin / Creatinine Urine Ratio - Urine, Clean Catch; Future  -     Cancel: CBC w AUTO Differential; Future  -     Cancel: Comprehensive metabolic panel; Future  -     Cancel: Lipid panel; Future  -     Cancel: TSH+Free T4; Future  -     Cancel: Testosterone; Future  -     traZODone (DESYREL) 100 MG tablet; Take 1 tablet by mouth Every  Night.  Dispense: 90 tablet; Refill: 1  -     tiotropium bromide monohydrate (Spiriva Respimat) 2.5 MCG/ACT aerosol solution inhaler; Inhale 2 puffs Daily.  Dispense: 12 g; Refill: 3  -     escitalopram (Lexapro) 10 MG tablet; Take 1 tablet by mouth Daily.  Dispense: 90 tablet; Refill: 1  -     Basic Metabolic Panel; Future  -     Comprehensive Metabolic Panel; Future  -     Lipid Panel; Future  -     TSH+Free T4; Future  -     T3, Free; Future  -     Vitamin B12; Future  -     Folate; Future  -     Magnesium; Future  -     Vitamin D,25-Hydroxy; Future  -     CBC & Differential; Future  -     MicroAlbumin, Urine, Random - Urine, Clean Catch; Future  -     Basic Metabolic Panel  -     Cancel: MicroAlbumin, Urine, Random - Urine, Clean Catch    3. Tobacco dependence in remission  -     Fluzone >6mos ([id number])  -     Cancel: Microalbumin / Creatinine Urine Ratio - Urine, Clean Catch; Future  -     Cancel: CBC w AUTO Differential; Future  -     Cancel: Comprehensive metabolic panel; Future  -     Cancel: Lipid panel; Future  -     Cancel: TSH+Free T4; Future  -     Cancel: Testosterone; Future  -     traZODone (DESYREL) 100 MG tablet; Take 1 tablet by mouth Every Night.  Dispense: 90 tablet; Refill: 1  -     tiotropium bromide monohydrate (Spiriva Respimat) 2.5 MCG/ACT aerosol solution inhaler; Inhale 2 puffs Daily.  Dispense: 12 g; Refill: 3  -     escitalopram (Lexapro) 10 MG tablet; Take 1 tablet by mouth Daily.  Dispense: 90 tablet; Refill: 1  -     Basic Metabolic Panel; Future  -     Comprehensive Metabolic Panel; Future  -     Lipid Panel; Future  -     TSH+Free T4; Future  -     T3, Free; Future  -     Vitamin B12; Future  -     Folate; Future  -     Magnesium; Future  -     Vitamin D,25-Hydroxy; Future  -     CBC & Differential; Future  -     MicroAlbumin, Urine, Random - Urine, Clean Catch; Future  -     Basic Metabolic Panel  -     Cancel: MicroAlbumin, Urine, Random - Urine, Clean Catch    4. Low  testosterone in male  Assessment & Plan:  -ffd by urology -now on 200 mg every 10 days    Orders:  -     Fluzone >6mos ([id number])  -     Cancel: Microalbumin / Creatinine Urine Ratio - Urine, Clean Catch; Future  -     Cancel: CBC w AUTO Differential; Future  -     Cancel: Comprehensive metabolic panel; Future  -     Cancel: Lipid panel; Future  -     Cancel: TSH+Free T4; Future  -     Cancel: Testosterone; Future  -     traZODone (DESYREL) 100 MG tablet; Take 1 tablet by mouth Every Night.  Dispense: 90 tablet; Refill: 1  -     tiotropium bromide monohydrate (Spiriva Respimat) 2.5 MCG/ACT aerosol solution inhaler; Inhale 2 puffs Daily.  Dispense: 12 g; Refill: 3  -     escitalopram (Lexapro) 10 MG tablet; Take 1 tablet by mouth Daily.  Dispense: 90 tablet; Refill: 1  -     Basic Metabolic Panel; Future  -     Comprehensive Metabolic Panel; Future  -     Lipid Panel; Future  -     TSH+Free T4; Future  -     T3, Free; Future  -     Vitamin B12; Future  -     Folate; Future  -     Magnesium; Future  -     Vitamin D,25-Hydroxy; Future  -     CBC & Differential; Future  -     MicroAlbumin, Urine, Random - Urine, Clean Catch; Future  -     Basic Metabolic Panel  -     Cancel: MicroAlbumin, Urine, Random - Urine, Clean Catch    5. Morbid obesity due to excess calories  -     Fluzone >6mos ([id number])  -     Cancel: Microalbumin / Creatinine Urine Ratio - Urine, Clean Catch; Future  -     Cancel: CBC w AUTO Differential; Future  -     Cancel: Comprehensive metabolic panel; Future  -     Cancel: Lipid panel; Future  -     Cancel: TSH+Free T4; Future  -     Cancel: Testosterone; Future  -     traZODone (DESYREL) 100 MG tablet; Take 1 tablet by mouth Every Night.  Dispense: 90 tablet; Refill: 1  -     tiotropium bromide monohydrate (Spiriva Respimat) 2.5 MCG/ACT aerosol solution inhaler; Inhale 2 puffs Daily.  Dispense: 12 g; Refill: 3  -     escitalopram (Lexapro) 10 MG tablet; Take 1 tablet by mouth Daily.   Dispense: 90 tablet; Refill: 1  -     Basic Metabolic Panel; Future  -     Comprehensive Metabolic Panel; Future  -     Lipid Panel; Future  -     TSH+Free T4; Future  -     T3, Free; Future  -     Vitamin B12; Future  -     Folate; Future  -     Magnesium; Future  -     Vitamin D,25-Hydroxy; Future  -     CBC & Differential; Future  -     MicroAlbumin, Urine, Random - Urine, Clean Catch; Future  -     Basic Metabolic Panel  -     MicroAlbumin, Urine, Random - Urine, Clean Catch  -     Cancel: MicroAlbumin, Urine, Random - Urine, Clean Catch    6. Centrilobular emphysema  Comments:  -PFTS c/w COPD-wife still smoking-use soiriva inhaler daily-CT chest -no lung mass/ca  Orders:  -     Fluzone >6mos ([id number])  -     Cancel: Microalbumin / Creatinine Urine Ratio - Urine, Clean Catch; Future  -     Cancel: CBC w AUTO Differential; Future  -     Cancel: Comprehensive metabolic panel; Future  -     Cancel: Lipid panel; Future  -     Cancel: TSH+Free T4; Future  -     Cancel: Testosterone; Future  -     traZODone (DESYREL) 100 MG tablet; Take 1 tablet by mouth Every Night.  Dispense: 90 tablet; Refill: 1  -     tiotropium bromide monohydrate (Spiriva Respimat) 2.5 MCG/ACT aerosol solution inhaler; Inhale 2 puffs Daily.  Dispense: 12 g; Refill: 3  -     escitalopram (Lexapro) 10 MG tablet; Take 1 tablet by mouth Daily.  Dispense: 90 tablet; Refill: 1  -     Basic Metabolic Panel; Future  -     Comprehensive Metabolic Panel; Future  -     Lipid Panel; Future  -     TSH+Free T4; Future  -     T3, Free; Future  -     Vitamin B12; Future  -     Folate; Future  -     Magnesium; Future  -     Vitamin D,25-Hydroxy; Future  -     CBC & Differential; Future  -     MicroAlbumin, Urine, Random - Urine, Clean Catch; Future  -     Basic Metabolic Panel  -     Cancel: MicroAlbumin, Urine, Random - Urine, Clean Catch    7. Reactive depression  -     Fluzone >6mos ([id number])  -     Cancel: Microalbumin / Creatinine Urine Ratio - Urine,  Clean Catch; Future  -     Cancel: CBC w AUTO Differential; Future  -     Cancel: Comprehensive metabolic panel; Future  -     Cancel: Lipid panel; Future  -     Cancel: TSH+Free T4; Future  -     Cancel: Testosterone; Future  -     traZODone (DESYREL) 100 MG tablet; Take 1 tablet by mouth Every Night.  Dispense: 90 tablet; Refill: 1  -     tiotropium bromide monohydrate (Spiriva Respimat) 2.5 MCG/ACT aerosol solution inhaler; Inhale 2 puffs Daily.  Dispense: 12 g; Refill: 3  -     escitalopram (Lexapro) 10 MG tablet; Take 1 tablet by mouth Daily.  Dispense: 90 tablet; Refill: 1  -     Basic Metabolic Panel; Future  -     Comprehensive Metabolic Panel; Future  -     Lipid Panel; Future  -     TSH+Free T4; Future  -     T3, Free; Future  -     Vitamin B12; Future  -     Folate; Future  -     Magnesium; Future  -     Vitamin D,25-Hydroxy; Future  -     CBC & Differential; Future  -     MicroAlbumin, Urine, Random - Urine, Clean Catch; Future  -     Basic Metabolic Panel  -     Cancel: MicroAlbumin, Urine, Random - Urine, Clean Catch    8. Vitamin D deficiency  -     Vitamin D,25-Hydroxy; Future  -     MicroAlbumin, Urine, Random - Urine, Clean Catch    9. Gastroesophageal reflux disease without esophagitis  -     MicroAlbumin, Urine, Random - Urine, Clean Catch    10. Chronic pain of both knees  -     MicroAlbumin, Urine, Random - Urine, Clean Catch    Other orders  -     Coenzyme Q10 (CoQ10) 100 MG capsule; One tab po daily  Dispense: 90 each; Refill: 3          Plan  10/16/24  Patient Instructions   Continue with Maxide 1/2 tablet daily  Continue with potassium with KCl 20 mEq once daily  Decrease Lexapro to 10 mg daily until next visit-since currently stable no SI or HI  Co Q10 for  myalgias  F/u 6 months with labs prior  Bmp today    For MRI of pit per Dr Acosta few days- sl elev Prl    FOLLOW UP  Return in about 6 months (around 4/16/2025) for Recheck, Annual physical.    Older notes  7/16/24 visit  \Patient was  given instructions and counseling regarding his condition or for health maintenance advice. Please see specific information pulled into the AVS if appropriate.      pt here for f/u HTN- maxzide dose increased 6 weeks ago, needs refill on pantoprazole-GERD, low testosterone among others   and labs     Records reviewed, meds reviewed in detail, labs reviewed with patient.  Plan of care is as follows below.     HTN--BP =140-150s- at home     GERD-out of protonix for 2 weeks-still needing ibuprofen 200 mg 6-9 tabs daily (600 mg tid  otc) for diffuse joint pains throughout     Chol-stable with meds-LDL improved-now in 90s     low Testosterone/hypogonadism-getting TRT from Dr Jiang-records reviewed  Unable to get wegovy     Potassium equals 3.4-on KCL now     Older notes  6/3/24 visit   pt here for f/u of HTN,chol     HTN--on amlodipine-140/90 at clinic-- at home BP = up to 150s/.90s-increase maxzide -goal BP <130/80     Knee pain -getting euflexxa (hyaluronic acid injeciton)      Low testosterone-ffd by urology -now on 200 mg every 10 days     Chol-- , HDL 35, Tchol 212     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      Older notes      Patient Instructions 6/3/24   Increase maxzide 75/50 once daily - goal BP <130/80 monitor BP at home  Do labs today   F/u 4-6 weeks to check BP       6/3/24 visit  f/u of HTN,chol     HTN--on amlodipine-140/90 at clinic-- at home BP = up to 150s/.90s-increase maxzide -goal BP <130/80     Knee pain -getting euflexxa (hyaluronic acid injeciton)      Low testosterone-ffd by urology -now on 200 mg every 10 days     Chol-- , HDL 35, Tchol 212     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  Plan of care is as follows below.     Main concern elbow pain -arthritic- mild relief with ibuprofen and feels swollen up after eating-for past 6 months     Chol- GGI=207, HDL=35, pdwt=168     HTN-taking meds-elevated-BP at home 149-150/90s     Hypogonadism-on chronic shots     Knee xrays-mod OA-saw Ortho Dr Barrios 3/19/24-for Visco injections  Pain is at-3-4/10     CXR-NAD     Patient Instructions 3/7/24   Do labs today   Xrays of knees and CXR  Do pfts  And low dose Ct chest  Refer to Ortho-Dr Tracy-for further evaluation of knee pain  Refer to endocrinologist for evaluation of hypogonadism and continuation of meds  Cut down portions  Monitor BP 2 x/day - 3-4 x/week and record goal is blood pressure less than 130/80  Start triamterene hydrochlorothiazide 1 tablet daily for blood pressure control  Follow-up in 3 weeks to check blood pressure and labs     3/7/24 visit  patient here to establish care-previous PCP was Dr Luevano- in [facility].     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

## 2024-10-16 NOTE — PATIENT INSTRUCTIONS
Continue with Maxide 1/2 tablet daily  Continue with potassium with KCl 20 mEq once daily  Decrease Lexapro to 10 mg daily until next visit-since currently stable no SI or HI  Co Q10 for  myalgias  F/u 6 months with labs prior  Bmp today

## 2024-10-17 NOTE — ASSESSMENT & PLAN NOTE
Hypertension-better controlled -BP =128/82    Plan -Cont with  lisinopril 40 mg 1 daily  amlodipine 5 mg 1 daily and Maxide 75-50 mg 1 tablet daily  Follow a low-salt diet-  Cut back on portions  Goal blood pressure is less than 130/80 continue to monitor at home

## 2024-10-22 ENCOUNTER — HOSPITAL ENCOUNTER (OUTPATIENT)
Dept: MRI IMAGING | Facility: HOSPITAL | Age: 59
Discharge: HOME | End: 2024-10-22
Admitting: UROLOGY
Payer: COMMERCIAL

## 2024-10-22 DIAGNOSIS — R79.89 PROLACTIN INCREASED: ICD-10-CM

## 2024-10-22 DIAGNOSIS — E29.1 HYPOGONADISM IN MALE: ICD-10-CM

## 2024-10-22 PROCEDURE — 0 GADOBENATE DIMEGLUMINE 529 MG/ML SOLUTION: Performed by: UROLOGY

## 2024-10-22 PROCEDURE — A9577 INJ MULTIHANCE: HCPCS | Performed by: UROLOGY

## 2024-10-22 PROCEDURE — 70553 MRI BRAIN STEM W/O & W/DYE: CPT

## 2024-10-22 RX ADMIN — GADOBENATE DIMEGLUMINE 20 ML: 529 INJECTION, SOLUTION INTRAVENOUS at 07:26

## 2024-10-27 ENCOUNTER — HOSPITAL ENCOUNTER (EMERGENCY)
Facility: HOSPITAL | Age: 59
Discharge: HOME | End: 2024-10-27
Attending: EMERGENCY MEDICINE | Admitting: EMERGENCY MEDICINE
Payer: COMMERCIAL

## 2024-10-27 ENCOUNTER — APPOINTMENT (OUTPATIENT)
Dept: GENERAL RADIOLOGY | Facility: HOSPITAL | Age: 59
End: 2024-10-27
Payer: COMMERCIAL

## 2024-10-27 VITALS
OXYGEN SATURATION: 92 % | WEIGHT: 305.12 LBS | RESPIRATION RATE: 22 BRPM | BODY MASS INDEX: 42.72 KG/M2 | TEMPERATURE: 99.6 F | SYSTOLIC BLOOD PRESSURE: 140 MMHG | HEART RATE: 93 BPM | DIASTOLIC BLOOD PRESSURE: 76 MMHG | HEIGHT: 71 IN

## 2024-10-27 DIAGNOSIS — B97.89 VIRAL RESPIRATORY ILLNESS: Primary | ICD-10-CM

## 2024-10-27 DIAGNOSIS — J40 BRONCHITIS: ICD-10-CM

## 2024-10-27 DIAGNOSIS — J98.8 VIRAL RESPIRATORY ILLNESS: Primary | ICD-10-CM

## 2024-10-27 LAB
ALBUMIN SERPL-MCNC: 3.7 G/DL (ref 3.5–5.2)
ALBUMIN/GLOB SERPL: 0.9 G/DL
ALP SERPL-CCNC: 79 U/L (ref 39–117)
ALT SERPL W P-5'-P-CCNC: 19 U/L (ref 1–41)
ANION GAP SERPL CALCULATED.3IONS-SCNC: 9.9 MMOL/L (ref 5–15)
AST SERPL-CCNC: 17 U/L (ref 1–40)
BASOPHILS # BLD AUTO: 0.07 10*3/MM3 (ref 0–0.2)
BASOPHILS NFR BLD AUTO: 0.8 % (ref 0–1.5)
BILIRUB SERPL-MCNC: 0.5 MG/DL (ref 0–1.2)
BUN SERPL-MCNC: 15 MG/DL (ref 6–20)
BUN/CREAT SERPL: 14.7 (ref 7–25)
CALCIUM SPEC-SCNC: 9.3 MG/DL (ref 8.6–10.5)
CHLORIDE SERPL-SCNC: 101 MMOL/L (ref 98–107)
CO2 SERPL-SCNC: 27.1 MMOL/L (ref 22–29)
CREAT SERPL-MCNC: 1.02 MG/DL (ref 0.76–1.27)
DEPRECATED RDW RBC AUTO: 43.8 FL (ref 37–54)
EGFRCR SERPLBLD CKD-EPI 2021: 85.7 ML/MIN/1.73
EOSINOPHIL # BLD AUTO: 0.25 10*3/MM3 (ref 0–0.4)
EOSINOPHIL NFR BLD AUTO: 2.7 % (ref 0.3–6.2)
ERYTHROCYTE [DISTWIDTH] IN BLOOD BY AUTOMATED COUNT: 14.1 % (ref 12.3–15.4)
FLUAV SUBTYP SPEC NAA+PROBE: NOT DETECTED
FLUBV RNA ISLT QL NAA+PROBE: NOT DETECTED
GLOBULIN UR ELPH-MCNC: 3.9 GM/DL
GLUCOSE SERPL-MCNC: 115 MG/DL (ref 65–99)
HCT VFR BLD AUTO: 40.8 % (ref 37.5–51)
HGB BLD-MCNC: 13.6 G/DL (ref 13–17.7)
HOLD SPECIMEN: NORMAL
IMM GRANULOCYTES # BLD AUTO: 0.08 10*3/MM3 (ref 0–0.05)
IMM GRANULOCYTES NFR BLD AUTO: 0.9 % (ref 0–0.5)
LYMPHOCYTES # BLD AUTO: 1.66 10*3/MM3 (ref 0.7–3.1)
LYMPHOCYTES NFR BLD AUTO: 17.9 % (ref 19.6–45.3)
MCH RBC QN AUTO: 28.3 PG (ref 26.6–33)
MCHC RBC AUTO-ENTMCNC: 33.3 G/DL (ref 31.5–35.7)
MCV RBC AUTO: 84.8 FL (ref 79–97)
MONOCYTES # BLD AUTO: 1.23 10*3/MM3 (ref 0.1–0.9)
MONOCYTES NFR BLD AUTO: 13.3 % (ref 5–12)
NEUTROPHILS NFR BLD AUTO: 5.99 10*3/MM3 (ref 1.7–7)
NEUTROPHILS NFR BLD AUTO: 64.4 % (ref 42.7–76)
NRBC BLD AUTO-RTO: 0 /100 WBC (ref 0–0.2)
NT-PROBNP SERPL-MCNC: 501.2 PG/ML (ref 0–900)
PLATELET # BLD AUTO: 249 10*3/MM3 (ref 140–450)
PMV BLD AUTO: 9.8 FL (ref 6–12)
POTASSIUM SERPL-SCNC: 4.2 MMOL/L (ref 3.5–5.2)
PROT SERPL-MCNC: 7.6 G/DL (ref 6–8.5)
QT INTERVAL: 345 MS
QTC INTERVAL: 423 MS
RBC # BLD AUTO: 4.81 10*6/MM3 (ref 4.14–5.8)
RSV RNA NPH QL NAA+NON-PROBE: NOT DETECTED
SARS-COV-2 RNA RESP QL NAA+PROBE: NOT DETECTED
SODIUM SERPL-SCNC: 138 MMOL/L (ref 136–145)
TROPONIN T SERPL HS-MCNC: <6 NG/L
WBC NRBC COR # BLD AUTO: 9.28 10*3/MM3 (ref 3.4–10.8)
WHOLE BLOOD HOLD COAG: NORMAL
WHOLE BLOOD HOLD SPECIMEN: NORMAL

## 2024-10-27 PROCEDURE — 96374 THER/PROPH/DIAG INJ IV PUSH: CPT

## 2024-10-27 PROCEDURE — 84484 ASSAY OF TROPONIN QUANT: CPT | Performed by: EMERGENCY MEDICINE

## 2024-10-27 PROCEDURE — 83880 ASSAY OF NATRIURETIC PEPTIDE: CPT | Performed by: EMERGENCY MEDICINE

## 2024-10-27 PROCEDURE — 87637 SARSCOV2&INF A&B&RSV AMP PRB: CPT | Performed by: EMERGENCY MEDICINE

## 2024-10-27 PROCEDURE — 85025 COMPLETE CBC W/AUTO DIFF WBC: CPT | Performed by: EMERGENCY MEDICINE

## 2024-10-27 PROCEDURE — 71045 X-RAY EXAM CHEST 1 VIEW: CPT

## 2024-10-27 PROCEDURE — 93005 ELECTROCARDIOGRAM TRACING: CPT

## 2024-10-27 PROCEDURE — 93005 ELECTROCARDIOGRAM TRACING: CPT | Performed by: EMERGENCY MEDICINE

## 2024-10-27 PROCEDURE — 99284 EMERGENCY DEPT VISIT MOD MDM: CPT

## 2024-10-27 PROCEDURE — 80053 COMPREHEN METABOLIC PANEL: CPT | Performed by: EMERGENCY MEDICINE

## 2024-10-27 PROCEDURE — 94640 AIRWAY INHALATION TREATMENT: CPT

## 2024-10-27 PROCEDURE — 94799 UNLISTED PULMONARY SVC/PX: CPT

## 2024-10-27 PROCEDURE — 25010000002 DEXAMETHASONE SODIUM PHOSPHATE 10 MG/ML SOLUTION: Performed by: NURSE PRACTITIONER

## 2024-10-27 RX ORDER — AZITHROMYCIN 250 MG/1
TABLET, FILM COATED ORAL
Qty: 6 TABLET | Refills: 0 | Status: SHIPPED | OUTPATIENT
Start: 2024-10-27

## 2024-10-27 RX ORDER — SODIUM CHLORIDE 0.9 % (FLUSH) 0.9 %
10 SYRINGE (ML) INJECTION AS NEEDED
Status: DISCONTINUED | OUTPATIENT
Start: 2024-10-27 | End: 2024-10-27 | Stop reason: HOSPADM

## 2024-10-27 RX ORDER — METHYLPREDNISOLONE 4 MG/1
TABLET ORAL
Qty: 21 TABLET | Refills: 0 | Status: SHIPPED | OUTPATIENT
Start: 2024-10-27 | End: 2024-10-27

## 2024-10-27 RX ORDER — ALBUTEROL SULFATE 0.83 MG/ML
2.5 SOLUTION RESPIRATORY (INHALATION) EVERY 4 HOURS PRN
Qty: 30 EACH | Refills: 0 | Status: SHIPPED | OUTPATIENT
Start: 2024-10-27 | End: 2024-10-27

## 2024-10-27 RX ORDER — DEXAMETHASONE SODIUM PHOSPHATE 10 MG/ML
10 INJECTION, SOLUTION INTRAMUSCULAR; INTRAVENOUS ONCE
Status: COMPLETED | OUTPATIENT
Start: 2024-10-27 | End: 2024-10-27

## 2024-10-27 RX ORDER — METHYLPREDNISOLONE 4 MG/1
TABLET ORAL
Qty: 21 TABLET | Refills: 0 | Status: SHIPPED | OUTPATIENT
Start: 2024-10-27

## 2024-10-27 RX ORDER — IPRATROPIUM BROMIDE AND ALBUTEROL SULFATE 2.5; .5 MG/3ML; MG/3ML
3 SOLUTION RESPIRATORY (INHALATION) ONCE
Status: COMPLETED | OUTPATIENT
Start: 2024-10-27 | End: 2024-10-27

## 2024-10-27 RX ORDER — ALBUTEROL SULFATE 0.83 MG/ML
2.5 SOLUTION RESPIRATORY (INHALATION) EVERY 4 HOURS PRN
Qty: 30 EACH | Refills: 0 | Status: SHIPPED | OUTPATIENT
Start: 2024-10-27

## 2024-10-27 RX ORDER — BENZONATATE 200 MG/1
200 CAPSULE ORAL 3 TIMES DAILY PRN
Qty: 30 CAPSULE | Refills: 0 | Status: SHIPPED | OUTPATIENT
Start: 2024-10-27 | End: 2024-10-27

## 2024-10-27 RX ORDER — AZITHROMYCIN 250 MG/1
TABLET, FILM COATED ORAL
Qty: 6 TABLET | Refills: 0 | Status: SHIPPED | OUTPATIENT
Start: 2024-10-27 | End: 2024-10-27

## 2024-10-27 RX ORDER — BENZONATATE 200 MG/1
200 CAPSULE ORAL 3 TIMES DAILY PRN
Qty: 30 CAPSULE | Refills: 0 | Status: SHIPPED | OUTPATIENT
Start: 2024-10-27

## 2024-10-27 RX ADMIN — IPRATROPIUM BROMIDE AND ALBUTEROL SULFATE 3 ML: .5; 3 SOLUTION RESPIRATORY (INHALATION) at 10:27

## 2024-10-27 RX ADMIN — DEXAMETHASONE SODIUM PHOSPHATE 10 MG: 10 INJECTION INTRAMUSCULAR; INTRAVENOUS at 12:15

## 2024-10-27 NOTE — ED PROVIDER NOTES
Time: 10:11 AM EDT  Date of encounter:  10/27/2024  Independent Historian/Clinical History and Information was obtained by:   Patient    History is limited by: N/A    Chief Complaint   Patient presents with    Shortness of Breath    Cough         History of Present Illness:  Patient is a 59 y.o. year old male who presents to the emergency department for evaluation of cough, chest congestion and shortness of air since Thursday/3 days. Denies fever. Denies CP    Patient Care Team  Primary Care Provider: Karen Rios MD    Past Medical History:     No Known Allergies  Past Medical History:   Diagnosis Date    GERD (gastroesophageal reflux disease)     Hypertension      History reviewed. No pertinent surgical history.  Family History   Family history unknown: Yes       Home Medications:  Prior to Admission medications    Medication Sig Start Date End Date Taking? Authorizing Provider   amLODIPine (NORVASC) 5 MG tablet Take 1 tablet by mouth Daily. 7/16/24   Karen Rios MD   atorvastatin (LIPITOR) 20 MG tablet Take 1 tablet by mouth Daily. 7/16/24   Karen Rios MD   Coenzyme Q10 (CoQ10) 100 MG capsule One tab po daily 10/16/24   Karen Rios MD   escitalopram (Lexapro) 10 MG tablet Take 1 tablet by mouth Daily. 10/16/24   Karen Rios MD   lisinopril (PRINIVIL,ZESTRIL) 40 MG tablet Take 1 tablet by mouth Daily. 7/16/24   Karen Rios MD   potassium chloride (KLOR-CON M20) 20 MEQ CR tablet Take 1 tablet by mouth 2 (Two) Times a Day. 6/3/24   Karen Rios MD   Testosterone Cypionate 200 MG/ML solution Inject 0.5 mL as directed Every 7 (Seven) Days for 180 days. 7/10/24 1/6/25  Antonette Montes MD   tiotropium bromide monohydrate (Spiriva Respimat) 2.5 MCG/ACT aerosol solution inhaler Inhale 2 puffs Daily. 10/16/24   Karen Rios MD   traZODone (DESYREL) 100  "MG tablet Take 1 tablet by mouth Every Night. 10/16/24   Karen Rios MD   triamterene-hydrochlorothiazide (Maxzide) 75-50 MG per tablet Take 1 tablet by mouth Daily.  Patient taking differently: Take 0.5 tablets by mouth Daily. 6/3/24   Karen Rios MD        Social History:   Social History     Tobacco Use    Smoking status: Former     Current packs/day: 0.00     Average packs/day: 0.5 packs/day for 19.9 years (10.0 ttl pk-yrs)     Types: Cigarettes     Start date: 1995     Quit date: 3/1/2015     Years since quittin.6     Passive exposure: Past    Smokeless tobacco: Never   Vaping Use    Vaping status: Never Used   Substance Use Topics    Alcohol use: Not Currently    Drug use: Never         Review of Systems:  Review of Systems   Constitutional:  Negative for chills and fever.   HENT:  Negative for congestion, ear pain and sore throat.    Eyes:  Negative for pain.   Respiratory:  Positive for cough and shortness of breath. Negative for chest tightness.    Cardiovascular:  Negative for chest pain.   Gastrointestinal:  Negative for abdominal pain, diarrhea, nausea and vomiting.   Genitourinary:  Negative for flank pain and hematuria.   Musculoskeletal:  Negative for joint swelling.   Skin:  Negative for pallor.   Neurological:  Negative for seizures and headaches.   All other systems reviewed and are negative.       Physical Exam:  /76 (BP Location: Left arm, Patient Position: Sitting)   Pulse 93   Temp 99.6 °F (37.6 °C) (Oral)   Resp 22   Ht 180.3 cm (71\")   Wt (!) 138 kg (305 lb 1.9 oz)   SpO2 92%   BMI 42.56 kg/m²         Physical Exam  Vitals and nursing note reviewed.   Constitutional:       General: He is not in acute distress.     Appearance: Normal appearance. He is not toxic-appearing.   HENT:      Head: Normocephalic and atraumatic.      Mouth/Throat:      Mouth: Mucous membranes are moist.   Eyes:      General: No scleral " icterus.  Cardiovascular:      Rate and Rhythm: Normal rate and regular rhythm.      Pulses: Normal pulses.      Heart sounds: Normal heart sounds.   Pulmonary:      Effort: Pulmonary effort is normal. No respiratory distress.      Breath sounds: Normal breath sounds.   Abdominal:      General: Abdomen is flat.      Palpations: Abdomen is soft.      Tenderness: There is no abdominal tenderness.   Musculoskeletal:         General: Normal range of motion.      Cervical back: Normal range of motion and neck supple.   Skin:     General: Skin is warm and dry.   Neurological:      Mental Status: He is alert and oriented to person, place, and time. Mental status is at baseline.                      Procedures:  Procedures      Medical Decision Making:      Comorbidities that affect care:    Chronic Lung Disease, Hypertension    External Notes reviewed:    None      The following orders were placed and all results were independently analyzed by me:  Orders Placed This Encounter   Procedures    Miscellaneous DME    COVID-19, FLU A/B, RSV PCR 1 HR TAT - Swab, Nasopharynx    XR Chest 1 View    [place] Draw    Comprehensive Metabolic Panel    BNP    Single High Sensitivity Troponin T    CBC Auto Differential    Undress & Gown    Continuous Pulse Oximetry    Vital Signs    Nebulizer machine for home use  Misc Nursing Order (Specify)    ECG 12 Lead ED Triage Standing Order; SOA    CBC & Differential    Green Top (Gel)    Lavender Top    Gold Top - SST    Light Blue Top    Extra Tubes    Gray Top       Medications Given in the Emergency Department:  Medications   ipratropium-albuterol (DUO-NEB) nebulizer solution 3 mL (3 mL Nebulization Given 10/27/24 1027)   dexAMETHasone sodium phosphate injection 10 mg (10 mg Intravenous Given 10/27/24 1215)        ED Course:    The patient was initially evaluated in the triage area where orders were placed. The patient was later dispositioned by CHRISTOPHER Lakhani.      The patient was  advised to stay for completion of workup which includes but is not limited to communication of labs and radiological results, reassessment and plan. The patient was advised that leaving prior to disposition by a provider could result in critical findings that are not communicated to the patient.          Labs:    Lab Results (last 24 hours)       Procedure Component Value Units Date/Time    COVID-19, FLU A/B, RSV PCR 1 HR TAT - Swab, Nasopharynx [[id number]]  (Normal) Collected: 10/27/24 0936    Specimen: Swab from Nasopharynx Updated: 10/27/24 1021     COVID19 Not Detected     Influenza A PCR Not Detected     Influenza B PCR Not Detected     RSV, PCR Not Detected    Narrative:      Fact sheet for providers: https://www.fda.gov/media/208715/download    Fact sheet for patients: https://www.fda.gov/media/930210/download    Test performed by PCR.    CBC & Differential [[id number]]  (Abnormal) Collected: 10/27/24 1046    Specimen: Blood Updated: 10/27/24 1054    Narrative:      The following orders were created for panel order CBC & Differential.  Procedure                               Abnormality         Status                     ---------                               -----------         ------                     CBC Auto Differential[[id number]]        Abnormal            Final result                 Please view results for these tests on the individual orders.    Comprehensive Metabolic Panel [[id number]]  (Abnormal) Collected: 10/27/24 1046    Specimen: Blood Updated: 10/27/24 1123     Glucose 115 mg/dL      BUN 15 mg/dL      Creatinine 1.02 mg/dL      Sodium 138 mmol/L      Potassium 4.2 mmol/L      Chloride 101 mmol/L      CO2 27.1 mmol/L      Calcium 9.3 mg/dL      Total Protein 7.6 g/dL      Albumin 3.7 g/dL      ALT (SGPT) 19 U/L      AST (SGOT) 17 U/L      Alkaline Phosphatase 79 U/L      Total Bilirubin 0.5 mg/dL      Globulin 3.9 gm/dL      A/G Ratio 0.9 g/dL      BUN/Creatinine Ratio 14.7     Anion Gap 9.9  mmol/L      eGFR 85.7 mL/min/1.73     Narrative:      GFR Normal >60  Chronic Kidney Disease <60  Kidney Failure <15      BNP [[id number]]  (Normal) Collected: 10/27/24 1046    Specimen: Blood Updated: 10/27/24 1119     proBNP 501.2 pg/mL     Narrative:      This assay is used as an aid in the diagnosis of individuals suspected of having heart failure. It can be used as an aid in the diagnosis of acute decompensated heart failure (ADHF) in patients presenting with signs and symptoms of ADHF to the emergency department (ED). In addition, NT-proBNP of <300 pg/mL indicates ADHF is not likely.    Age Range Result Interpretation  NT-proBNP Concentration (pg/mL:      <50             Positive            >450                   Gray                 300-450                    Negative             <300    50-75           Positive            >900                  Gray                300-900                  Negative            <300      >75             Positive            >1800                  Gray                300-1800                  Negative            <300    Single High Sensitivity Troponin T [[id number]]  (Normal) Collected: 10/27/24 1046    Specimen: Blood Updated: 10/27/24 1123     HS Troponin T <6 ng/L     Narrative:      High Sensitive Troponin T Reference Range:  <14.0 ng/L- Negative Female for AMI  <22.0 ng/L- Negative Male for AMI  >=14 - Abnormal Female indicating possible myocardial injury.  >=22 - Abnormal Male indicating possible myocardial injury.   Clinicians would have to utilize clinical acumen, EKG, Troponin, and serial changes to determine if it is an Acute Myocardial Infarction or myocardial injury due to an underlying chronic condition.         CBC Auto Differential [[id number]]  (Abnormal) Collected: 10/27/24 1046    Specimen: Blood Updated: 10/27/24 1054     WBC 9.28 10*3/mm3      RBC 4.81 10*6/mm3      Hemoglobin 13.6 g/dL      Hematocrit 40.8 %      MCV 84.8 fL      MCH 28.3 pg      MCHC 33.3 g/dL       RDW 14.1 %      RDW-SD 43.8 fl      MPV 9.8 fL      Platelets 249 10*3/mm3      Neutrophil % 64.4 %      Lymphocyte % 17.9 %      Monocyte % 13.3 %      Eosinophil % 2.7 %      Basophil % 0.8 %      Immature Grans % 0.9 %      Neutrophils, Absolute 5.99 10*3/mm3      Lymphocytes, Absolute 1.66 10*3/mm3      Monocytes, Absolute 1.23 10*3/mm3      Eosinophils, Absolute 0.25 10*3/mm3      Basophils, Absolute 0.07 10*3/mm3      Immature Grans, Absolute 0.08 10*3/mm3      nRBC 0.0 /100 WBC              Imaging:    XR Chest 1 View    Result Date: 10/27/2024  XR CHEST 1 VW Date of Exam: 10/27/2024 9:50 AM EDT Indication: SOA Triage Protocol Comparison: None available. Findings: Unremarkable cardiomediastinal silhouette. No focal airspace consolidation. No pleural effusion or pneumothorax. No acute osseous abnormality.     Impression: No acute cardiopulmonary abnormality. Electronically Signed: Robbie Prater MD  10/27/2024 10:00 AM EDT  Workstation ID: [id number]       Differential Diagnosis and Discussion:      Cough: Differential diagnosis includes but is not limited to pneumonia, acute bronchitis, upper respiratory infection, ACE inhibitor use, allergic reaction, epiglottitis, seasonal allergies, chemical irritants, exercise-induced asthma, viral syndrome.  Dyspnea: Differential diagnosis includes but is not limited to metabolic acidosis, neurological disorders, psychogenic, asthma, pneumothorax, upper airway obstruction, COPD, pneumonia, noncardiogenic pulmonary edema, interstitial lung disease, anemia, congestive heart failure, and pulmonary embolism    All labs were reviewed and interpreted by me.  All X-rays impressions were independently interpreted by me.  EKG was interpreted by supervising attending.    MDM  Number of Diagnoses or Management Options  Bronchitis  Viral respiratory illness  Diagnosis management comments: Pt feels better after neb tx. Will provide neb machine for home use and medications for  outpatient tx. Pt advised to return to ER if SOA worsens or any concerns develop.       Amount and/or Complexity of Data Reviewed  Clinical lab tests: reviewed  Tests in the radiology section of CPT®: reviewed  Tests in the medicine section of CPT®: reviewed                     Patient Care Considerations:    SEPSIS IS NOT PRESENT IN THE EMERGENCY DEPARTMENT: Patient meets SIRS criteria in the emergency department however the patient does not have a known source of bacterial infection to confirm the diagnosis of sepsis.        Consultants/Shared Management Plan:    None    Social Determinants of Health:    Patient is independent, reliable, and has access to care.       Disposition and Care Coordination:    Discharged: I considered escalation of care by admitting this patient to the hospital, however pt feels va after neb tx. No resp distress. No hypoxia    I have explained the patient´s condition, diagnoses and treatment plan based on the information available to me at this time. I have answered questions and addressed any concerns. The patient has a good  understanding of the patient´s diagnosis, condition, and treatment plan as can be expected at this point. The vital signs have been stable. The patient´s condition is stable and appropriate for discharge from the emergency department.      The patient will pursue further outpatient evaluation with the primary care physician or other designated or consulting physician as outlined in the discharge instructions. They are agreeable to this plan of care and follow-up instructions have been explained in detail. The patient has received these instructions in written format and has expressed an understanding of the discharge instructions. The patient is aware that any significant change in condition or worsening of symptoms should prompt an immediate return to this or the closest emergency department or call to 911.    Final diagnoses:   Viral respiratory illness    Bronchitis        ED Disposition       ED Disposition   Discharge    Condition   Stable    Comment   --               This medical record created using voice recognition software.             Anthony Irizarry, APRN  10/27/24 6303

## 2024-11-10 LAB
QT INTERVAL: 345 MS
QTC INTERVAL: 423 MS

## 2024-11-11 ENCOUNTER — OFFICE VISIT (OUTPATIENT)
Dept: ORTHOPEDIC SURGERY | Facility: CLINIC | Age: 59
End: 2024-11-11
Payer: COMMERCIAL

## 2024-11-11 VITALS
WEIGHT: 310 LBS | SYSTOLIC BLOOD PRESSURE: 130 MMHG | HEART RATE: 78 BPM | BODY MASS INDEX: 43.4 KG/M2 | HEIGHT: 71 IN | DIASTOLIC BLOOD PRESSURE: 77 MMHG | OXYGEN SATURATION: 95 %

## 2024-11-11 DIAGNOSIS — M17.0 BILATERAL PRIMARY OSTEOARTHRITIS OF KNEE: Primary | ICD-10-CM

## 2024-11-11 RX ORDER — HYALURONATE SODIUM 10 MG/ML
20 SYRINGE (ML) INTRAARTICULAR
Status: COMPLETED | OUTPATIENT
Start: 2024-11-11 | End: 2024-11-11

## 2024-11-11 RX ADMIN — Medication 20 MG: at 08:24

## 2024-11-11 RX ADMIN — Medication 20 MG: at 08:25

## 2024-11-11 NOTE — PROGRESS NOTES
"Chief Complaint  Follow-up of the Left Knee and Follow-up of the Right Knee    Subjective          Follow-up        Kevin Jackson is a 59 y.o. male  presents to [facility] for     Patient presents for follow-up evaluation of bilateral knee pain and bilateral knee osteoarthritis.  He is here to receive Euflexxa injection #1.  He has tried cortisone injections in the past with minimal relief.  He states these viscosupplementation injections do help and he would like to continue with that treatment plan.  He denies new injury or symptoms of pain he is very active, he states he went hunting this weekend.      No Known Allergies     Social History     Socioeconomic History    Marital status:    Tobacco Use    Smoking status: Former     Current packs/day: 0.00     Average packs/day: 0.5 packs/day for 19.9 years (10.0 ttl pk-yrs)     Types: Cigarettes     Start date: 1995     Quit date: 3/1/2015     Years since quittin.7     Passive exposure: Past    Smokeless tobacco: Never   Vaping Use    Vaping status: Never Used   Substance and Sexual Activity    Alcohol use: Not Currently    Drug use: Never    Sexual activity: Defer        REVIEW OF SYSTEMS    Constitutional: Awake alert and oriented x3, no acute distress, denies fevers, chills, weight loss  Respiratory: No respiratory distress  Vascular: Brisk cap refill, Intact distal pulses, No cyanosis, compartments soft with no signs or symptoms of compartment syndrome or DVT.   Cardiovascular: Denies chest pain, shortness of breath  Skin: Denies rashes, acute skin changes  Neurologic: Denies headache, loss of consciousness  MSK: Bilateral knee pain      Objective   Vital Signs:   /77   Pulse 78   Ht 180.3 cm (70.98\")   Wt (!) 141 kg (310 lb)   SpO2 95%   BMI 43.26 kg/m²     Body mass index is 43.26 kg/m².    Physical Exam       Right knee- Positive EHL, FHL, GS and TA. Sensation intact to all 5 nerves of the foot. " Positive pulses. Non-tender. ROM -3 to 120 degrees. Stable to varus/valgus stress. Stable to anterior/posterior drawer. No effusion. Mild swelling. Negative Lachman's. Negative Canelo's. Positive crepitus.      Left knee- Positive EHL, FHL, GS and TA. Sensation intact to all 5 nerves of the foot. Positive pulses. Non-tender. ROM 0 to 120 degrees. Stable to varus/valgus stress. Stable to anterior/posterior drawer. No effusion. Mild swelling. Negative Lachman's. Negative Canelo's. Positive crepitus.         Large Joint: R knee  Date/Time: 11/11/2024 8:24 AM  Consent given by: patient  Site marked: site marked  Timeout: Immediately prior to procedure a time out was called to verify the correct patient, procedure, equipment, support staff and site/side marked as required   Supporting Documentation  Indications: pain   Procedure Details  Location: knee - R knee  Preparation: Patient was prepped and draped in the usual sterile fashion  Needle gauge: 21g.  Medications administered: 20 mg Sodium Hyaluronate (Viscosup) 20 MG/2ML  Patient tolerance: patient tolerated the procedure well with no immediate complications      Large Joint: L knee  Date/Time: 11/11/2024 8:25 AM  Consent given by: patient  Site marked: site marked  Timeout: Immediately prior to procedure a time out was called to verify the correct patient, procedure, equipment, support staff and site/side marked as required   Supporting Documentation  Indications: pain   Procedure Details  Location: knee - L knee  Preparation: Patient was prepped and draped in the usual sterile fashion  Needle gauge: 21g.  Medications administered: 20 mg Sodium Hyaluronate (Viscosup) 20 MG/2ML  Patient tolerance: patient tolerated the procedure well with no immediate complications      This injection documentation was Scribed for BRO Banks by Kristina Hess MA.  11/11/24   08:25 EST    Imaging Results (Most Recent)       None             Result Review :    The following data was reviewed by: BRO Banks on 11/11/2024:               Assessment and Plan    Diagnoses and all orders for this visit:    1. Bilateral primary osteoarthritis of knee (Primary)    Other orders  -     Large Joint: R knee  -     Large Joint: L knee        Discussed diagnosis and treatment options with the patient he elected to have bilateral knee Euflexxa injection #1.  A sterile prep of the injection site was performed with chloraprep. Cryospray was used for local anesthesia. The site was injected. The patient tolerated the procedure well without complications. Post injection pain was discussed.    The risks, benefits, complications, treatment options/alternatives, and expected outcomes/goals were discussed with the patient.   Follow-up in 1 week for Euflexxa injection #2    Call or return if worsening symptoms.    Follow Up   Return in about 1 week (around 11/18/2024) for Recheck.  Patient was given instructions and counseling regarding his condition or for health maintenance advice. Please see specific information pulled into the AVS if appropriate.       EMR Dragon/Transcription disclaimer:  Part of this note may be an electronic transcription/translation of spoken language to printed text using the Dragon Dictation System

## 2024-11-18 ENCOUNTER — OFFICE VISIT (OUTPATIENT)
Dept: ORTHOPEDIC SURGERY | Facility: CLINIC | Age: 59
End: 2024-11-18
Payer: COMMERCIAL

## 2024-11-18 VITALS
WEIGHT: 310 LBS | SYSTOLIC BLOOD PRESSURE: 126 MMHG | OXYGEN SATURATION: 91 % | BODY MASS INDEX: 43.4 KG/M2 | HEIGHT: 71 IN | DIASTOLIC BLOOD PRESSURE: 83 MMHG | HEART RATE: 85 BPM

## 2024-11-18 DIAGNOSIS — M17.0 BILATERAL PRIMARY OSTEOARTHRITIS OF KNEE: Primary | ICD-10-CM

## 2024-11-18 PROCEDURE — 20610 DRAIN/INJ JOINT/BURSA W/O US: CPT | Performed by: PHYSICIAN ASSISTANT

## 2024-11-18 RX ORDER — HYALURONATE SODIUM 10 MG/ML
20 SYRINGE (ML) INTRAARTICULAR
Status: COMPLETED | OUTPATIENT
Start: 2024-11-18 | End: 2024-11-18

## 2024-11-18 RX ADMIN — Medication 20 MG: at 09:09

## 2024-11-18 NOTE — PROGRESS NOTES
"Chief Complaint  Follow-up of the Left Knee and Follow-up of the Right Knee    Subjective          Follow-up        Kevin Jackson is a 59 y.o. male  presents to [facility] for     Patient presents for follow-up evaluation of bilateral knee pain and bilateral knee osteoarthritis he is here to receive Euflexxa injection #2.  He states the first injection did up his knees he does feel some improvement it and he is hopeful the following injections will continue to help.      No Known Allergies     Social History     Socioeconomic History    Marital status:    Tobacco Use    Smoking status: Former     Current packs/day: 0.00     Average packs/day: 0.5 packs/day for 19.9 years (10.0 ttl pk-yrs)     Types: Cigarettes     Start date: 1995     Quit date: 3/1/2015     Years since quittin.7     Passive exposure: Past    Smokeless tobacco: Never   Vaping Use    Vaping status: Never Used   Substance and Sexual Activity    Alcohol use: Not Currently    Drug use: Never    Sexual activity: Defer        REVIEW OF SYSTEMS    Constitutional: Awake alert and oriented x3, no acute distress, denies fevers, chills, weight loss  Respiratory: No respiratory distress  Vascular: Brisk cap refill, Intact distal pulses, No cyanosis, compartments soft with no signs or symptoms of compartment syndrome or DVT.   Cardiovascular: Denies chest pain, shortness of breath  Skin: Denies rashes, acute skin changes  Neurologic: Denies headache, loss of consciousness  MSK: Bilateral knee pain      Objective   Vital Signs:   /83   Pulse 85   Ht 180.3 cm (70.98\")   Wt (!) 141 kg (310 lb)   SpO2 91%   BMI 43.26 kg/m²     Body mass index is 43.26 kg/m².    Physical Exam          Right knee- Positive EHL, FHL, GS and TA. Sensation intact to all 5 nerves of the foot. Positive pulses. Non-tender. ROM -3 to 120 degrees. Stable to varus/valgus stress. Stable to anterior/posterior drawer. No effusion. Mild " swelling. Negative Lachman's. Negative Canelo's. Positive crepitus.      Left knee- Positive EHL, FHL, GS and TA. Sensation intact to all 5 nerves of the foot. Positive pulses. Non-tender. ROM 0 to 120 degrees. Stable to varus/valgus stress. Stable to anterior/posterior drawer. No effusion. Mild swelling. Negative Lachman's. Negative Canelo's. Positive crepitus.      Large Joint: R knee  Date/Time: 11/18/2024 9:09 AM  Consent given by: patient  Site marked: site marked  Timeout: Immediately prior to procedure a time out was called to verify the correct patient, procedure, equipment, support staff and site/side marked as required   Supporting Documentation  Indications: pain   Procedure Details  Location: knee - R knee  Preparation: Patient was prepped and draped in the usual sterile fashion  Needle gauge: 21g.  Medications administered: 20 mg Sodium Hyaluronate (Viscosup) 20 MG/2ML  Patient tolerance: patient tolerated the procedure well with no immediate complications      Large Joint: L knee  Date/Time: 11/18/2024 9:09 AM  Consent given by: patient  Site marked: site marked  Timeout: Immediately prior to procedure a time out was called to verify the correct patient, procedure, equipment, support staff and site/side marked as required   Supporting Documentation  Indications: pain   Procedure Details  Location: knee - L knee  Preparation: Patient was prepped and draped in the usual sterile fashion  Needle gauge: 21g.  Medications administered: 20 mg Sodium Hyaluronate (Viscosup) 20 MG/2ML  Patient tolerance: patient tolerated the procedure well with no immediate complications      This injection documentation was Scribed for BRO Banks by Kristina Hess MA.  11/18/24   09:10 EST    Imaging Results (Most Recent)       None             Result Review :   The following data was reviewed by: BRO Banks on 11/18/2024:               Assessment and Plan    Diagnoses and all orders  for this visit:    1. Bilateral primary osteoarthritis of knee (Primary)    Other orders  -     Large Joint: R knee  -     Large Joint: L knee        Discussed diagnosis and treatment options with patient he elected to have bilateral knee Euflexxa injection #2.  A sterile prep of the injection site was performed with chloraprep. Cryospray was used for local anesthesia. The site was injected. The patient tolerated the procedure well without complications. Post injection pain was discussed.    The risks, benefits, complications, treatment options/alternatives, and expected outcomes/goals were discussed with the patient.   Follow-up in 1 week for Euflexxa injection #3    Call or return if worsening symptoms.    Follow Up   Return in about 1 week (around 11/25/2024) for Recheck.  Patient was given instructions and counseling regarding his condition or for health maintenance advice. Please see specific information pulled into the AVS if appropriate.       EMR Dragon/Transcription disclaimer:  Part of this note may be an electronic transcription/translation of spoken language to printed text using the Dragon Dictation System

## 2024-11-25 ENCOUNTER — OFFICE VISIT (OUTPATIENT)
Dept: ORTHOPEDIC SURGERY | Facility: CLINIC | Age: 59
End: 2024-11-25
Payer: COMMERCIAL

## 2024-11-25 VITALS
DIASTOLIC BLOOD PRESSURE: 73 MMHG | SYSTOLIC BLOOD PRESSURE: 134 MMHG | BODY MASS INDEX: 43.4 KG/M2 | HEART RATE: 91 BPM | WEIGHT: 310 LBS | OXYGEN SATURATION: 94 % | HEIGHT: 71 IN

## 2024-11-25 DIAGNOSIS — M17.0 BILATERAL PRIMARY OSTEOARTHRITIS OF KNEE: Primary | ICD-10-CM

## 2024-11-25 RX ORDER — HYALURONATE SODIUM 10 MG/ML
20 SYRINGE (ML) INTRAARTICULAR
Status: COMPLETED | OUTPATIENT
Start: 2024-11-25 | End: 2024-11-25

## 2024-11-25 RX ADMIN — Medication 20 MG: at 08:24

## 2024-11-25 RX ADMIN — Medication 20 MG: at 08:23

## 2024-11-25 NOTE — PROGRESS NOTES
"Chief Complaint  Follow-up of the Left Knee and Follow-up of the Right Knee    Subjective          History of Present Illness      Kevin Jackson is a 59 y.o. male  presents to [facility] for     Patient presents for follow-up evaluation of bilateral knee pain, bilateral knee osteoarthritis and to receive Euflexxa injection #3.  He states that he continues to have some improvement to his knees with the injections.  No new complaints.      No Known Allergies     Social History     Socioeconomic History    Marital status:    Tobacco Use    Smoking status: Former     Current packs/day: 0.00     Average packs/day: 0.5 packs/day for 19.9 years (10.0 ttl pk-yrs)     Types: Cigarettes     Start date: 1995     Quit date: 3/1/2015     Years since quittin.7     Passive exposure: Past    Smokeless tobacco: Never   Vaping Use    Vaping status: Never Used   Substance and Sexual Activity    Alcohol use: Not Currently    Drug use: Never    Sexual activity: Defer        REVIEW OF SYSTEMS    Constitutional: Awake alert and oriented x3, no acute distress, denies fevers, chills, weight loss  Respiratory: No respiratory distress  Vascular: Brisk cap refill, Intact distal pulses, No cyanosis, compartments soft with no signs or symptoms of compartment syndrome or DVT.   Cardiovascular: Denies chest pain, shortness of breath  Skin: Denies rashes, acute skin changes  Neurologic: Denies headache, loss of consciousness  MSK: Bilateral knee pain      Objective   Vital Signs:   /73   Pulse 91   Ht 180.3 cm (70.98\")   Wt (!) 141 kg (310 lb)   SpO2 94%   BMI 43.26 kg/m²     Body mass index is 43.26 kg/m².    Physical Exam          Right knee- Positive EHL, FHL, GS and TA. Sensation intact to all 5 nerves of the foot. Positive pulses. Non-tender. ROM -3 to 120 degrees. Stable to varus/valgus stress. Stable to anterior/posterior drawer. No effusion. Mild swelling. Negative Lachman's. " Negative Canelo's. Positive crepitus.      Left knee- Positive EHL, FHL, GS and TA. Sensation intact to all 5 nerves of the foot. Positive pulses. Non-tender. ROM 0 to 120 degrees. Stable to varus/valgus stress. Stable to anterior/posterior drawer. No effusion. Mild swelling. Negative Lachman's. Negative Canelo's. Positive crepitus.      Large Joint: R knee  Date/Time: 11/25/2024 8:23 AM  Consent given by: patient  Site marked: site marked  Timeout: Immediately prior to procedure a time out was called to verify the correct patient, procedure, equipment, support staff and site/side marked as required   Supporting Documentation  Indications: pain   Procedure Details  Location: knee - R knee  Preparation: Patient was prepped and draped in the usual sterile fashion  Needle gauge: 21g.  Medications administered: 20 mg Sodium Hyaluronate (Viscosup) 20 MG/2ML  Patient tolerance: patient tolerated the procedure well with no immediate complications      Large Joint: L knee  Date/Time: 11/25/2024 8:24 AM  Consent given by: patient  Site marked: site marked  Timeout: Immediately prior to procedure a time out was called to verify the correct patient, procedure, equipment, support staff and site/side marked as required   Supporting Documentation  Indications: pain   Procedure Details  Location: knee - L knee  Preparation: Patient was prepped and draped in the usual sterile fashion  Needle gauge: 21g.  Medications administered: 20 mg Sodium Hyaluronate (Viscosup) 20 MG/2ML  Patient tolerance: patient tolerated the procedure well with no immediate complications    This injection documentation was Scribed for BRO Banks by Kristina Hess MA.  11/25/24   08:24 EST    Imaging Results (Most Recent)       None             Result Review :   The following data was reviewed by: BRO Banks on 11/25/2024:               Assessment and Plan    Diagnoses and all orders for this visit:    1. Bilateral  primary osteoarthritis of knee (Primary)        Discussed diagnosis and treatment options with the patient he elected to have Euflexxa injection #3 bilaterally.  A sterile prep of the injection site was performed with chloraprep. Cryospray was used for local anesthesia. The site was injected. The patient tolerated the procedure well without complications. Post injection pain was discussed.    The risks, benefits, complications, treatment options/alternatives, and expected outcomes/goals were discussed with the patient.   Follow-up in 3 months    Call or return if worsening symptoms.    Follow Up   Return in about 3 months (around 2/25/2025) for Recheck.  Patient was given instructions and counseling regarding his condition or for health maintenance advice. Please see specific information pulled into the AVS if appropriate.       EMR Dragon/Transcription disclaimer:  Part of this note may be an electronic transcription/translation of spoken language to printed text using the Dragon Dictation System

## 2024-12-13 ENCOUNTER — TELEPHONE (OUTPATIENT)
Dept: INTERNAL MEDICINE | Age: 59
End: 2024-12-13

## 2024-12-13 NOTE — TELEPHONE ENCOUNTER
Caller: Kevin Jackson    Relationship: Self    Best call back number: [phone]    Requested Prescriptions:          Albuterol Sulfate 108 (90 Base) MCG/ACT 2 puffs Inhalation Every 4 Hours PRN      Pharmacy where request should be sent: [facility] [address] [place], [place] [phone] [place] [phone]      Last office visit with prescribing clinician: 10/16/2024   Last telemedicine visit with prescribing clinician: Visit date not found   Next office visit with prescribing clinician: 4/16/2025     Additional details provided by patient: REQUESTING A 90 DAY SUPPLY     Does the patient have less than a 3 day supply:  [x] Yes  [] No    Would you like a call back once the refill request has been completed: [x] Yes [] No    If the office needs to give you a call back, can they leave a voicemail: [x] Yes [] No    Meel Brown Rep   12/13/24 15:22 EST

## 2024-12-16 ENCOUNTER — TELEPHONE (OUTPATIENT)
Dept: INTERNAL MEDICINE | Age: 59
End: 2024-12-16
Payer: COMMERCIAL

## 2024-12-16 DIAGNOSIS — R06.02 SHORTNESS OF BREATH: Primary | ICD-10-CM

## 2024-12-16 RX ORDER — ALBUTEROL SULFATE 90 UG/1
INHALANT RESPIRATORY (INHALATION)
Qty: 54 G | Refills: 1 | Status: SHIPPED | OUTPATIENT
Start: 2024-12-16

## 2025-01-16 ENCOUNTER — OFFICE VISIT (OUTPATIENT)
Dept: INTERNAL MEDICINE | Age: 60
End: 2025-01-16
Payer: COMMERCIAL

## 2025-01-16 VITALS
DIASTOLIC BLOOD PRESSURE: 80 MMHG | HEART RATE: 80 BPM | OXYGEN SATURATION: 94 % | TEMPERATURE: 97.7 F | HEIGHT: 71 IN | BODY MASS INDEX: 44.1 KG/M2 | SYSTOLIC BLOOD PRESSURE: 138 MMHG | WEIGHT: 315 LBS | RESPIRATION RATE: 16 BRPM

## 2025-01-16 DIAGNOSIS — J45.41 MODERATE PERSISTENT ASTHMATIC BRONCHITIS WITH ACUTE EXACERBATION: Primary | ICD-10-CM

## 2025-01-16 PROCEDURE — 99213 OFFICE O/P EST LOW 20 MIN: CPT | Performed by: INTERNAL MEDICINE

## 2025-01-16 RX ORDER — METHYLPREDNISOLONE 4 MG/1
TABLET ORAL
Qty: 21 TABLET | Refills: 0 | Status: SHIPPED | OUTPATIENT
Start: 2025-01-16 | End: 2025-01-21

## 2025-01-16 RX ORDER — BENZONATATE 100 MG/1
100 CAPSULE ORAL 3 TIMES DAILY PRN
Qty: 30 CAPSULE | Refills: 0 | Status: SHIPPED | OUTPATIENT
Start: 2025-01-16

## 2025-01-16 RX ORDER — AZITHROMYCIN 250 MG/1
TABLET, FILM COATED ORAL
Qty: 6 TABLET | Refills: 0 | Status: SHIPPED | OUTPATIENT
Start: 2025-01-16

## 2025-01-16 RX ORDER — MONTELUKAST SODIUM 10 MG/1
10 TABLET ORAL NIGHTLY
Qty: 30 TABLET | Refills: 1 | Status: SHIPPED | OUTPATIENT
Start: 2025-01-16

## 2025-01-16 NOTE — PROGRESS NOTES
CHIEF COMPLAINT  Kevin Jackson presents to [facility] for follow-up of Cough (Sometimes green, throat irritation, not sore, nose drainage-clear, bilateral ear itching but no pain, a little headache, shortness of air, sinus pressure. Fatigue but no body aches.  Symptoms started about two weeks ago. Dayquil and Nyquil are not helping. ).    HPI  59-year-old patient with PFTs with moderate COPD, hypertension, hyperlipidemia depression among others here with complaint of cough with green sputum now wheezing on exertion- see above for the past 2 weeks    Records reviewed, meds reviewed in detail, labs reviewed with patient.  Plan of care is as follows below.    PMFSH-Reviewed  ROS-cough earache postnasal drainage      Current Outpatient Medications:     albuterol sulfate  (90 Base) MCG/ACT inhaler, 2 puffs QID prn soa, Disp: 54 g, Rfl: 1    amLODIPine (NORVASC) 5 MG tablet, Take 1 tablet by mouth Daily., Disp: 90 tablet, Rfl: 1    atorvastatin (LIPITOR) 20 MG tablet, Take 1 tablet by mouth Daily., Disp: 90 tablet, Rfl: 1    Coenzyme Q10 (CoQ10) 100 MG capsule, One tab po daily, Disp: 90 each, Rfl: 3    escitalopram (Lexapro) 10 MG tablet, Take 1 tablet by mouth Daily., Disp: 90 tablet, Rfl: 1    lisinopril (PRINIVIL,ZESTRIL) 40 MG tablet, Take 1 tablet by mouth Daily., Disp: 30 tablet, Rfl: 5    potassium chloride (KLOR-CON M20) 20 MEQ CR tablet, Take 1 tablet by mouth 2 (Two) Times a Day., Disp: 30 tablet, Rfl: 5    tiotropium bromide monohydrate (Spiriva Respimat) 2.5 MCG/ACT aerosol solution inhaler, Inhale 2 puffs Daily., Disp: 12 g, Rfl: 3    traZODone (DESYREL) 100 MG tablet, Take 1 tablet by mouth Every Night., Disp: 90 tablet, Rfl: 1    triamterene-hydrochlorothiazide (Maxzide) 75-50 MG per tablet, Take 1 tablet by mouth Daily. (Patient taking differently: Take 0.5 tablets by mouth Daily.), Disp: 90 tablet, Rfl: 1    azithromycin (Zithromax Z-Elio) 250 MG tablet, Take  "2 tablets by mouth on day 1, then 1 tablet daily on days 2-5, Disp: 6 tablet, Rfl: 0    benzonatate (Tessalon Perles) 100 MG capsule, Take 1 capsule by mouth 3 (Three) Times a Day As Needed for Cough., Disp: 30 capsule, Rfl: 0    methylPREDNISolone (MEDROL) 4 MG dose pack, Take 6 tablets by mouth Daily for 1 day, THEN 5 tablets Daily for 1 day, THEN 4 tablets Daily for 1 day, THEN 3 tablets Daily for 1 day, THEN 2 tablets Daily for 1 day, THEN 1 tablet Daily for 1 day. Take as directed on package instructions., Disp: 21 tablet, Rfl: 0    montelukast (Singulair) 10 MG tablet, Take 1 tablet by mouth Every Night., Disp: 30 tablet, Rfl: 1   PFSH reviewed.      OBJECTIVE  Vital Signs  Vitals:    01/16/25 1137   BP: 138/80   BP Location: Left arm   Patient Position: Sitting   Cuff Size: Large Adult   Pulse: 80   Resp: 16   Temp: 97.7 °F (36.5 °C)   TempSrc: Temporal   SpO2: 94%   Weight: (!) 148 kg (327 lb)   Height: 180.3 cm (71\")      Body mass index is 45.61 kg/m².    Physical Exam  Vitals and nursing note reviewed.   Constitutional:       Appearance: Normal appearance.   HENT:      Head: Normocephalic and atraumatic.      Right Ear: Tympanic membrane normal.      Left Ear: Tympanic membrane normal.      Nose: Rhinorrhea present.      Mouth/Throat:      Pharynx: No oropharyngeal exudate or posterior oropharyngeal erythema.   Eyes:      Extraocular Movements: Extraocular movements intact.      Pupils: Pupils are equal, round, and reactive to light.   Cardiovascular:      Rate and Rhythm: Normal rate and regular rhythm.   Pulmonary:      Effort: Pulmonary effort is normal. No respiratory distress.      Breath sounds: Wheezing and rhonchi present. No rales.   Abdominal:      General: Abdomen is flat.      Palpations: Abdomen is soft.   Musculoskeletal:      Cervical back: Normal range of motion and neck supple.   Skin:     General: Skin is warm and dry.   Neurological:      General: No focal deficit present.      Mental " Status: He is alert and oriented to person, place, and time.   Psychiatric:         Mood and Affect: Mood normal.         Behavior: Behavior normal.          RESULTS REVIEW  Lab Results   Component Value Date    PROBNP 501.2 10/27/2024     CMP          9/13/2024    08:58 10/16/2024    10:22 10/27/2024    10:46   CMP   Glucose 101  104  115    BUN 31  21  15    Creatinine 1.53  1.19  1.02    EGFR 52.7  71.2  85.7    Sodium 134  133  138    Potassium 4.4  4.4  4.2    Chloride 100  101  101    Calcium 9.0  9.4  9.3    Total Protein 7.0   7.6    Albumin 4.1   3.7    Globulin 2.9   3.9    Total Bilirubin 0.5   0.5    Alkaline Phosphatase 72   79    AST (SGOT) 25   17    ALT (SGPT) 24   19    Albumin/Globulin Ratio 1.4   0.9    BUN/Creatinine Ratio 20.3  17.6  14.7    Anion Gap 11.6  8.9  9.9      CBC w/diff          6/3/2024    08:35 9/13/2024    08:58 10/27/2024    10:46   CBC w/Diff   WBC 7.30  6.52  9.28    RBC 6.26  5.47  4.81    Hemoglobin 17.4  15.3  13.6    Hematocrit 52.6  45.0  40.8    MCV 84.0  82.3  84.8    MCH 27.8  28.0  28.3    MCHC 33.1  34.0  33.3    RDW 13.8  13.8  14.1    Platelets 274  218  249    Neutrophil Rel % 67.2  64.6  64.4    Immature Granulocyte Rel % 0.4  0.3  0.9    Lymphocyte Rel % 22.2  23.6  17.9    Monocyte Rel % 6.6  8.3  13.3    Eosinophil Rel % 2.6  2.1  2.7    Basophil Rel % 1.0  1.1  0.8       Lipid Panel          3/7/2024    14:21 6/3/2024    08:35   Lipid Panel   Total Cholesterol 212  141    Triglycerides 123  79    HDL Cholesterol 35  36    VLDL Cholesterol 22  15    LDL Cholesterol  155  90    LDL/HDL Ratio 4.35  2.48       Lab Results   Component Value Date    TSH 3.050 09/13/2024    TSH 2.090 03/07/2024      Lab Results   Component Value Date    FREET4 1.07 09/13/2024    FREET4 1.01 03/07/2024         Lab Results   Component Value Date    DIPKVENP89 739 06/03/2024    WNXJ49AX 44.1 09/13/2024    MG 2.1 09/13/2024     PSA          6/3/2024    08:35 9/13/2024    08:58   PSA    PSA 0.864  0.963       XR Chest 1 View    Result Date: 10/27/2024  Impression: No acute cardiopulmonary abnormality. Electronically Signed: Robbie Prater MD  10/27/2024 10:00 AM EDT  Workstation ID: [id number]    MRI Pituitary With & Without Contrast    Result Date: 10/22/2024  Impression: Essentially normal contrast-enhanced MRI of the brain including dedicated evaluation of the pituitary as above. Electronically Signed: Stefano Naqvi MD  10/22/2024 3:26 PM EDT  Workstation ID: [id number]               ASSESSMENT & PLAN  Diagnoses and all orders for this visit:    1. Moderate persistent asthmatic bronchitis with acute exacerbation (Primary)  Comments:  Will treat with Z-Elio and Medrol Dosepak supportive treatment-Tessalon Perles prn cough try Singulair to help with PND  follow-up if worsens-o/ as scheduled  Orders:  -     azithromycin (Zithromax Z-Elio) 250 MG tablet; Take 2 tablets by mouth on day 1, then 1 tablet daily on days 2-5  Dispense: 6 tablet; Refill: 0  -     methylPREDNISolone (MEDROL) 4 MG dose pack; Take 6 tablets by mouth Daily for 1 day, THEN 5 tablets Daily for 1 day, THEN 4 tablets Daily for 1 day, THEN 3 tablets Daily for 1 day, THEN 2 tablets Daily for 1 day, THEN 1 tablet Daily for 1 day. Take as directed on package instructions.  Dispense: 21 tablet; Refill: 0  -     benzonatate (Tessalon Perles) 100 MG capsule; Take 1 capsule by mouth 3 (Three) Times a Day As Needed for Cough.  Dispense: 30 capsule; Refill: 0  -     montelukast (Singulair) 10 MG tablet; Take 1 tablet by mouth Every Night.  Dispense: 30 tablet; Refill: 1            Plan-1/16/2025  Patient Instructions   Give Z-Elio and Medrol Dosepak for 5 days  Tessalon Perles for cough as needed and Singulair/montelukast to help with drainage once daily     FOLLOW UP  Return if symptoms worsen or fail to improve, for Next scheduled follow up, Recheck.    Patient was given instructions and counseling regarding his condition or for health  maintenance advice. Please see specific information pulled into the AVS if appropriate.

## 2025-03-04 DIAGNOSIS — E66.01 MORBID OBESITY DUE TO EXCESS CALORIES: ICD-10-CM

## 2025-03-04 DIAGNOSIS — F32.9 REACTIVE DEPRESSION: ICD-10-CM

## 2025-03-04 DIAGNOSIS — F17.201 TOBACCO DEPENDENCE IN REMISSION: ICD-10-CM

## 2025-03-04 DIAGNOSIS — J43.2 CENTRILOBULAR EMPHYSEMA: ICD-10-CM

## 2025-03-04 DIAGNOSIS — I10 PRIMARY HYPERTENSION: ICD-10-CM

## 2025-03-04 DIAGNOSIS — E78.00 PURE HYPERCHOLESTEROLEMIA: ICD-10-CM

## 2025-03-04 DIAGNOSIS — R79.89 LOW TESTOSTERONE IN MALE: ICD-10-CM

## 2025-03-04 RX ORDER — LISINOPRIL 40 MG/1
40 TABLET ORAL DAILY
Qty: 30 TABLET | Refills: 5 | Status: SHIPPED | OUTPATIENT
Start: 2025-03-04

## 2025-03-04 RX ORDER — ESCITALOPRAM OXALATE 10 MG/1
10 TABLET ORAL DAILY
Qty: 90 TABLET | Refills: 1 | Status: SHIPPED | OUTPATIENT
Start: 2025-03-04

## 2025-03-04 RX ORDER — AMLODIPINE BESYLATE 5 MG/1
5 TABLET ORAL DAILY
Qty: 90 TABLET | Refills: 1 | Status: SHIPPED | OUTPATIENT
Start: 2025-03-04

## 2025-03-04 RX ORDER — ATORVASTATIN CALCIUM 20 MG/1
20 TABLET, FILM COATED ORAL DAILY
Qty: 90 TABLET | Refills: 1 | Status: SHIPPED | OUTPATIENT
Start: 2025-03-04

## 2025-03-04 RX ORDER — TRAZODONE HYDROCHLORIDE 100 MG/1
100 TABLET ORAL NIGHTLY
Qty: 90 TABLET | Refills: 1 | Status: SHIPPED | OUTPATIENT
Start: 2025-03-04

## 2025-03-04 NOTE — TELEPHONE ENCOUNTER
Caller: Kevin Jackson    Relationship: Self    Best call back number:     [phone] (Mobile)       Requested Prescriptions:   Requested Prescriptions     Pending Prescriptions Disp Refills    escitalopram (Lexapro) 10 MG tablet 90 tablet 1     Sig: Take 1 tablet by mouth Daily.    traZODone (DESYREL) 100 MG tablet 90 tablet 1     Sig: Take 1 tablet by mouth Every Night.    amLODIPine (NORVASC) 5 MG tablet 90 tablet 1     Sig: Take 1 tablet by mouth Daily.    lisinopril (PRINIVIL,ZESTRIL) 40 MG tablet 30 tablet 5     Sig: Take 1 tablet by mouth Daily.    atorvastatin (LIPITOR) 20 MG tablet 90 tablet 1     Sig: Take 1 tablet by mouth Daily.        Pharmacy where request should be sent: [address]     Last office visit with prescribing clinician: 1/16/2025   Last telemedicine visit with prescribing clinician: Visit date not found   Next office visit with prescribing clinician: 4/16/2025     Additional details provided by patient:     Does the patient have less than a 3 day supply:  [] Yes  [x] No      France Saavedra, PCT   03/04/25 15:00 EST

## 2025-04-08 ENCOUNTER — TELEPHONE (OUTPATIENT)
Dept: ORTHOPEDIC SURGERY | Facility: CLINIC | Age: 60
End: 2025-04-08

## 2025-04-08 ENCOUNTER — OFFICE VISIT (OUTPATIENT)
Dept: ORTHOPEDIC SURGERY | Facility: CLINIC | Age: 60
End: 2025-04-08
Payer: COMMERCIAL

## 2025-04-08 VITALS
WEIGHT: 315 LBS | HEART RATE: 87 BPM | OXYGEN SATURATION: 94 % | BODY MASS INDEX: 44.1 KG/M2 | HEIGHT: 71 IN | DIASTOLIC BLOOD PRESSURE: 88 MMHG | SYSTOLIC BLOOD PRESSURE: 150 MMHG

## 2025-04-08 DIAGNOSIS — I10 PRIMARY HYPERTENSION: ICD-10-CM

## 2025-04-08 DIAGNOSIS — M17.0 BILATERAL PRIMARY OSTEOARTHRITIS OF KNEE: Primary | ICD-10-CM

## 2025-04-08 RX ORDER — TRIAMCINOLONE ACETONIDE 40 MG/ML
40 INJECTION, SUSPENSION INTRA-ARTICULAR; INTRAMUSCULAR
Status: COMPLETED | OUTPATIENT
Start: 2025-04-08 | End: 2025-04-08

## 2025-04-08 RX ORDER — TRIAMTERENE AND HYDROCHLOROTHIAZIDE 75; 50 MG/1; MG/1
0.5 TABLET ORAL DAILY
Qty: 45 TABLET | Refills: 3 | Status: SHIPPED | OUTPATIENT
Start: 2025-04-08

## 2025-04-08 RX ORDER — LIDOCAINE HYDROCHLORIDE 10 MG/ML
5 INJECTION, SOLUTION INFILTRATION; PERINEURAL
Status: COMPLETED | OUTPATIENT
Start: 2025-04-08 | End: 2025-04-08

## 2025-04-08 RX ADMIN — TRIAMCINOLONE ACETONIDE 40 MG: 40 INJECTION, SUSPENSION INTRA-ARTICULAR; INTRAMUSCULAR at 08:35

## 2025-04-08 RX ADMIN — LIDOCAINE HYDROCHLORIDE 5 ML: 10 INJECTION, SOLUTION INFILTRATION; PERINEURAL at 08:35

## 2025-04-08 NOTE — TELEPHONE ENCOUNTER
Rx Refill Note  Requested Prescriptions      No prescriptions requested or ordered in this encounter      Last office visit with prescribing clinician: 1/16/2025   Last telemedicine visit with prescribing clinician: Visit date not found   Next office visit with prescribing clinician: 4/16/2025                         Would you like a call back once the refill request has been completed: [] Yes [] No    If the office needs to give you a call back, can they leave a voicemail: [] Yes [] No    Soraya Lai MA  04/08/25, 09:06 EDT

## 2025-04-08 NOTE — PROGRESS NOTES
"Chief Complaint  Follow-up of the Right Knee and Follow-up of the Left Knee    Subjective          History of Present Illness      Kevin Jackson is a 60 y.o. male  presents to [facility] for     Patient presents for follow-up evaluation of bilateral knee pain and bilateral knee osteoarthritis and to receive bilateral knee steroid injections.  He was last seen in November and received viscosupplementation's injections with Euflexxa No. 3 he states these have not lasted as long as in the past and he is requesting bilateral knee steroid injections.  He states the right knee hurts more than the left he points anterior medial and lateral knee as his areas of pain      No Known Allergies     Social History     Socioeconomic History    Marital status:    Tobacco Use    Smoking status: Former     Current packs/day: 0.00     Average packs/day: 0.5 packs/day for 19.9 years (10.0 ttl pk-yrs)     Types: Cigarettes     Start date: 4/1/1995     Quit date: 3/1/2015     Years since quitting: 10.1     Passive exposure: Past    Smokeless tobacco: Never   Vaping Use    Vaping status: Never Used   Substance and Sexual Activity    Alcohol use: Not Currently    Drug use: Never    Sexual activity: Defer        REVIEW OF SYSTEMS    Constitutional: Awake alert and oriented x3, no acute distress, denies fevers, chills, weight loss  Respiratory: No respiratory distress  Vascular: Brisk cap refill, Intact distal pulses, No cyanosis, compartments soft with no signs or symptoms of compartment syndrome or DVT.   Cardiovascular: Denies chest pain, shortness of breath  Skin: Denies rashes, acute skin changes  Neurologic: Denies headache, loss of consciousness  MSK: Bilateral knee pain      Objective   Vital Signs:   /88   Pulse 87   Ht 180.3 cm (70.98\")   Wt (!) 148 kg (326 lb 3.2 oz)   SpO2 94%   BMI 45.52 kg/m²     Body mass index is 45.52 kg/m².    Physical Exam          Right knee-skin is " intact, no erythema, no ecchymosis or swelling, no effusion or signs of infection, non-tender.  Extension -3, flexion 120 degrees. Stable to varus/valgus stress. Stable to anterior/posterior drawer. Negative Lachman's. Negative Canelo's. Positive crepitus.      Left knee- skin is intact, no erythema, no ecchymosis or swelling, no effusion or signs of infection, non-tender.  Full extension, flexion 120 degrees. Stable to varus/valgus stress. Stable to anterior/posterior drawer. Negative Lachman's. Negative Canelo's. Positive crepitus.       Large Joint: R knee  Date/Time: 4/8/2025 8:35 AM  Consent given by: patient  Site marked: site marked  Timeout: Immediately prior to procedure a time out was called to verify the correct patient, procedure, equipment, support staff and site/side marked as required   Supporting Documentation  Indications: pain   Procedure Details  Location: knee - R knee  Preparation: Patient was prepped and draped in the usual sterile fashion  Needle gauge: 21g.  Medications administered: 5 mL lidocaine 1 %; 40 mg triamcinolone acetonide 40 MG/ML  Patient tolerance: patient tolerated the procedure well with no immediate complications      Large Joint: L knee  Date/Time: 4/8/2025 8:35 AM  Consent given by: patient  Site marked: site marked  Timeout: Immediately prior to procedure a time out was called to verify the correct patient, procedure, equipment, support staff and site/side marked as required   Supporting Documentation  Indications: pain   Procedure Details  Location: knee - L knee  Preparation: Patient was prepped and draped in the usual sterile fashion  Needle gauge: 21g.  Medications administered: 5 mL lidocaine 1 %; 40 mg triamcinolone acetonide 40 MG/ML  Patient tolerance: patient tolerated the procedure well with no immediate complications      This injection documentation was Scribed for BRO Banks by Kristina Hess MA.  04/08/25   08:36 EDT    Imaging Results  (Most Recent)       None                   Assessment and Plan    Diagnoses and all orders for this visit:    1. Bilateral primary osteoarthritis of knee (Primary)  -     Large Joint: R knee  -     Large Joint: L knee        Discussed diagnosis and treatment options with the patient he elected to have bilateral knee steroid injections.  A sterile prep of the injection site was performed with chloraprep. Cryospray was used for local anesthesia. The site was injected. The patient tolerated the procedure well without complications. Post injection pain was discussed.    The risks, benefits, complications, treatment options/alternatives, and expected outcomes/goals were discussed with the patient.   We will seek approval for bilateral knee viscosupplementation injections.    Call or return if worsening symptoms.    Follow Up   Return for WHEN INJECTION IS APPROVED.  Patient was given instructions and counseling regarding his condition or for health maintenance advice. Please see specific information pulled into the AVS if appropriate.       EMR Dragon/Transcription disclaimer:  Part of this note may be an electronic transcription/translation of spoken language to printed text using the Dragon Dictation System

## 2025-04-16 ENCOUNTER — OFFICE VISIT (OUTPATIENT)
Dept: INTERNAL MEDICINE | Age: 60
End: 2025-04-16
Payer: COMMERCIAL

## 2025-04-16 ENCOUNTER — RESULTS FOLLOW-UP (OUTPATIENT)
Facility: HOSPITAL | Age: 60
End: 2025-04-16
Payer: COMMERCIAL

## 2025-04-16 ENCOUNTER — LAB (OUTPATIENT)
Facility: HOSPITAL | Age: 60
End: 2025-04-16
Payer: COMMERCIAL

## 2025-04-16 VITALS
DIASTOLIC BLOOD PRESSURE: 90 MMHG | WEIGHT: 315 LBS | BODY MASS INDEX: 44.1 KG/M2 | SYSTOLIC BLOOD PRESSURE: 140 MMHG | RESPIRATION RATE: 18 BRPM | HEIGHT: 71 IN

## 2025-04-16 DIAGNOSIS — Z00.00 ROUTINE HISTORY AND PHYSICAL EXAMINATION OF ADULT: ICD-10-CM

## 2025-04-16 DIAGNOSIS — E66.01 CLASS 3 SEVERE OBESITY DUE TO EXCESS CALORIES WITH SERIOUS COMORBIDITY AND BODY MASS INDEX (BMI) OF 40.0 TO 44.9 IN ADULT: ICD-10-CM

## 2025-04-16 DIAGNOSIS — F17.201 TOBACCO DEPENDENCE IN REMISSION: ICD-10-CM

## 2025-04-16 DIAGNOSIS — F32.9 REACTIVE DEPRESSION: ICD-10-CM

## 2025-04-16 DIAGNOSIS — Z23 NEED FOR SHINGLES VACCINE: ICD-10-CM

## 2025-04-16 DIAGNOSIS — J43.2 CENTRILOBULAR EMPHYSEMA: ICD-10-CM

## 2025-04-16 DIAGNOSIS — I10 PRIMARY HYPERTENSION: ICD-10-CM

## 2025-04-16 DIAGNOSIS — Z00.00 ROUTINE HISTORY AND PHYSICAL EXAMINATION OF ADULT: Primary | ICD-10-CM

## 2025-04-16 DIAGNOSIS — R79.89 LOW TESTOSTERONE IN MALE: ICD-10-CM

## 2025-04-16 DIAGNOSIS — E78.00 PURE HYPERCHOLESTEROLEMIA: ICD-10-CM

## 2025-04-16 DIAGNOSIS — M17.0 PRIMARY OSTEOARTHRITIS OF BOTH KNEES: ICD-10-CM

## 2025-04-16 DIAGNOSIS — Z12.2 SCREENING FOR LUNG CANCER: ICD-10-CM

## 2025-04-16 DIAGNOSIS — Z13.1 SCREENING FOR DIABETES MELLITUS: ICD-10-CM

## 2025-04-16 DIAGNOSIS — E66.813 CLASS 3 SEVERE OBESITY DUE TO EXCESS CALORIES WITH SERIOUS COMORBIDITY AND BODY MASS INDEX (BMI) OF 40.0 TO 44.9 IN ADULT: ICD-10-CM

## 2025-04-16 DIAGNOSIS — E66.01 MORBID OBESITY DUE TO EXCESS CALORIES: ICD-10-CM

## 2025-04-16 LAB
25(OH)D3 SERPL-MCNC: 36 NG/ML (ref 30–100)
ALBUMIN SERPL-MCNC: 4.1 G/DL (ref 3.5–5.2)
ALBUMIN/GLOB SERPL: 1.2 G/DL
ALP SERPL-CCNC: 71 U/L (ref 39–117)
ALT SERPL W P-5'-P-CCNC: 22 U/L (ref 1–41)
ANION GAP SERPL CALCULATED.3IONS-SCNC: 12.8 MMOL/L (ref 5–15)
AST SERPL-CCNC: 25 U/L (ref 1–40)
BASOPHILS # BLD AUTO: 0.05 10*3/MM3 (ref 0–0.2)
BASOPHILS NFR BLD AUTO: 0.5 % (ref 0–1.5)
BILIRUB SERPL-MCNC: 0.7 MG/DL (ref 0–1.2)
BUN SERPL-MCNC: 27 MG/DL (ref 6–20)
BUN/CREAT SERPL: 22.9 (ref 7–25)
CALCIUM SPEC-SCNC: 9.1 MG/DL (ref 8.6–10.5)
CHLORIDE SERPL-SCNC: 98 MMOL/L (ref 98–107)
CHOLEST SERPL-MCNC: 163 MG/DL (ref 0–200)
CO2 SERPL-SCNC: 21.2 MMOL/L (ref 22–29)
CREAT SERPL-MCNC: 1.18 MG/DL (ref 0.76–1.27)
DEPRECATED RDW RBC AUTO: 40.3 FL (ref 37–54)
EGFRCR SERPLBLD CKD-EPI 2021: 71.5 ML/MIN/1.73
EOSINOPHIL # BLD AUTO: 0.11 10*3/MM3 (ref 0–0.4)
EOSINOPHIL NFR BLD AUTO: 1.2 % (ref 0.3–6.2)
ERYTHROCYTE [DISTWIDTH] IN BLOOD BY AUTOMATED COUNT: 13.4 % (ref 12.3–15.4)
FOLATE SERPL-MCNC: 13.2 NG/ML (ref 4.78–24.2)
GLOBULIN UR ELPH-MCNC: 3.4 GM/DL
GLUCOSE SERPL-MCNC: 100 MG/DL (ref 65–99)
HBA1C MFR BLD: 5.8 % (ref 4.8–5.6)
HCT VFR BLD AUTO: 48.3 % (ref 37.5–51)
HDLC SERPL-MCNC: 40 MG/DL (ref 40–60)
HGB BLD-MCNC: 16.3 G/DL (ref 13–17.7)
IMM GRANULOCYTES # BLD AUTO: 0.05 10*3/MM3 (ref 0–0.05)
IMM GRANULOCYTES NFR BLD AUTO: 0.5 % (ref 0–0.5)
LDLC SERPL CALC-MCNC: 110 MG/DL (ref 0–100)
LDLC/HDLC SERPL: 2.73 {RATIO}
LYMPHOCYTES # BLD AUTO: 1.77 10*3/MM3 (ref 0.7–3.1)
LYMPHOCYTES NFR BLD AUTO: 19.5 % (ref 19.6–45.3)
MAGNESIUM SERPL-MCNC: 2.2 MG/DL (ref 1.6–2.6)
MCH RBC QN AUTO: 28 PG (ref 26.6–33)
MCHC RBC AUTO-ENTMCNC: 33.7 G/DL (ref 31.5–35.7)
MCV RBC AUTO: 83 FL (ref 79–97)
MONOCYTES # BLD AUTO: 0.62 10*3/MM3 (ref 0.1–0.9)
MONOCYTES NFR BLD AUTO: 6.8 % (ref 5–12)
NEUTROPHILS NFR BLD AUTO: 6.5 10*3/MM3 (ref 1.7–7)
NEUTROPHILS NFR BLD AUTO: 71.5 % (ref 42.7–76)
NRBC BLD AUTO-RTO: 0 /100 WBC (ref 0–0.2)
PLATELET # BLD AUTO: 281 10*3/MM3 (ref 140–450)
PMV BLD AUTO: 10.2 FL (ref 6–12)
POTASSIUM SERPL-SCNC: 4.1 MMOL/L (ref 3.5–5.2)
PROT SERPL-MCNC: 7.5 G/DL (ref 6–8.5)
RBC # BLD AUTO: 5.82 10*6/MM3 (ref 4.14–5.8)
SODIUM SERPL-SCNC: 132 MMOL/L (ref 136–145)
T3FREE SERPL-MCNC: 3.15 PG/ML (ref 2–4.4)
T4 FREE SERPL-MCNC: 1.29 NG/DL (ref 0.92–1.68)
TRIGL SERPL-MCNC: 69 MG/DL (ref 0–150)
TSH SERPL DL<=0.05 MIU/L-ACNC: 3.73 UIU/ML (ref 0.27–4.2)
VIT B12 BLD-MCNC: 728 PG/ML (ref 211–946)
VLDLC SERPL-MCNC: 13 MG/DL (ref 5–40)
WBC NRBC COR # BLD AUTO: 9.1 10*3/MM3 (ref 3.4–10.8)

## 2025-04-16 PROCEDURE — 36415 COLL VENOUS BLD VENIPUNCTURE: CPT

## 2025-04-16 PROCEDURE — 80053 COMPREHEN METABOLIC PANEL: CPT

## 2025-04-16 PROCEDURE — 83735 ASSAY OF MAGNESIUM: CPT

## 2025-04-16 PROCEDURE — 84481 FREE ASSAY (FT-3): CPT

## 2025-04-16 PROCEDURE — 82746 ASSAY OF FOLIC ACID SERUM: CPT

## 2025-04-16 PROCEDURE — 85025 COMPLETE CBC W/AUTO DIFF WBC: CPT

## 2025-04-16 PROCEDURE — 82306 VITAMIN D 25 HYDROXY: CPT

## 2025-04-16 PROCEDURE — 84439 ASSAY OF FREE THYROXINE: CPT

## 2025-04-16 PROCEDURE — 80061 LIPID PANEL: CPT

## 2025-04-16 PROCEDURE — 83036 HEMOGLOBIN GLYCOSYLATED A1C: CPT

## 2025-04-16 PROCEDURE — 82607 VITAMIN B-12: CPT

## 2025-04-16 PROCEDURE — 84443 ASSAY THYROID STIM HORMONE: CPT

## 2025-04-16 RX ORDER — FLUTICASONE FUROATE, UMECLIDINIUM BROMIDE AND VILANTEROL TRIFENATATE 100; 62.5; 25 UG/1; UG/1; UG/1
1 POWDER RESPIRATORY (INHALATION)
Qty: 14 EACH | Refills: 0 | COMMUNITY
Start: 2025-04-16

## 2025-04-16 RX ORDER — AMLODIPINE BESYLATE 2.5 MG/1
TABLET ORAL
Qty: 30 TABLET | Refills: 1 | Status: SHIPPED | OUTPATIENT
Start: 2025-04-16

## 2025-04-16 NOTE — PROGRESS NOTES
CHIEF COMPLAINT  Kevin Jackson presents to [facility] for follow-up of Establish Care (-6M F/U/-PHYSICAL ).    HPI  History of Present Illness  The patient is a 60-year-old male who presents for a physical and routine follow-up. The patient has underlying hypertension, GERD, hyperlipidemia, low testosterone among others.    He reports experiencing dyspnea, which he attributes to his COPD and emphysema. He has been abstinent from smoking for approximately 10 to 15 years, following a 15-year period of smoking half a pack daily. He does not report any hemoptysis. His current medication regimen includes an inhaler, administered as 2 puffs in the morning, and a rescue inhaler, used as needed. He estimates the use of albuterol at 2 to 3 times daily. He has not previously tried the Trelegy inhaler. He also reports limited exercise capacity due to breathlessness and audible wheezing. He has been monitoring his oxygen saturation at home, with readings typically between 92 to 94 percent. He expresses concern about potential hypoxemia contributing to his fatigue.    He has a history of sleep apnea, characterized by snoring and apneic episodes, which result in daytime fatigue.    He has implemented dietary changes, including the elimination of soda and sweets, and adherence to a strict eating schedule, with no food intake after 6 PM. He maintains a regular meal schedule of three meals per day and attempts to manage hunger with fiber drinks. He has been less active since January 2025 and aims to lose 20 pounds. He has been over 300 pounds since he was 55 or 56 years old.    He monitors his blood pressure at home, which averages around 138 systolic when he is relaxed post-medication. He occasionally forgets his morning dose of Maxzide, resulting in elevated blood pressure. He reports no instances of systolic blood pressure exceeding 160. He is currently on amlodipine 5 mg, lisinopril 40 mg,  and Maxzide 75-50 mg, the latter of which he takes half a pill due to previous episodes of lightheadedness.    He experiences daily muscle cramps, which can be alleviated by stretching. He is currently on Lipitor 20 mg and CoQ10 100 mg.    He has been on testosterone replacement therapy for several years under the care of Dr. Montes, who has since retired. He is seeking a new provider for this treatment.    He is currently on Lexapro 10 mg for depression, which he reports as effective. He does not endorse any suicidal or homicidal ideations.    He reports occasional nocturia and increased thirst.    SOCIAL HISTORY  The patient is a former smoker, having quit approximately 10 to 15 years ago after smoking half a pack a day for about 15 years. He does not drink alcohol.    FAMILY HISTORY  The patient reports no family history of diabetes.    MEDICATIONS  Current: Amlodipine, lisinopril, Maxzide, Lexapro, Lipitor, CoQ10, albuterol, Respimat    IMMUNIZATIONS  The patient has received the COVID-19 vaccine.      Records reviewed, meds reviewed in detail, labs reviewed with patient.  Plan of care is as follows below.    PMFSH-Reviewed  ROS-no myalgias, WEIGHT LOSS 10 LBS  PAST 3 MONTHS, NO hi NO si      Current Outpatient Medications:     albuterol sulfate  (90 Base) MCG/ACT inhaler, 2 puffs QID prn soa, Disp: 54 g, Rfl: 1    amLODIPine (NORVASC) 5 MG tablet, Take 1 tablet by mouth Daily., Disp: 90 tablet, Rfl: 1    atorvastatin (LIPITOR) 20 MG tablet, Take 1 tablet by mouth Daily., Disp: 90 tablet, Rfl: 1    Coenzyme Q10 (CoQ10) 100 MG capsule, One tab po daily, Disp: 90 each, Rfl: 3    escitalopram (Lexapro) 10 MG tablet, Take 1 tablet by mouth Daily., Disp: 90 tablet, Rfl: 1    lisinopril (PRINIVIL,ZESTRIL) 40 MG tablet, Take 1 tablet by mouth Daily., Disp: 30 tablet, Rfl: 5    tiotropium bromide monohydrate (Spiriva Respimat) 2.5 MCG/ACT aerosol solution inhaler, Inhale 2 puffs Daily., Disp: 12 g, Rfl: 3     "traZODone (DESYREL) 100 MG tablet, Take 1 tablet by mouth Every Night., Disp: 90 tablet, Rfl: 1    triamterene-hydrochlorothiazide (Maxzide) 75-50 MG per tablet, Take 0.5 tablets by mouth Daily., Disp: 45 tablet, Rfl: 3    amLODIPine (NORVASC) 2.5 MG tablet, Take 2.5 mg if SBP >150 only one daily as needed-continue with amlodipine 5 mg q d, Disp: 30 tablet, Rfl: 1    Fluticasone-Umeclidin-Vilant (Trelegy Ellipta) 100-62.5-25 MCG/ACT inhaler, Inhale 1 puff Daily., Disp: 14 each, Rfl: 0    Fluticasone-Umeclidin-Vilant (TRELEGY) 100-62.5-25 MCG/ACT inhaler, Inhale 1 puff Daily., Disp: 28 each, Rfl: 5   PFSH reviewed.      OBJECTIVE  Vital Signs  Vitals:    04/16/25 0814 04/16/25 0849   BP: 145/89 140/90   BP Location:  Left arm   Patient Position:  Sitting   Cuff Size:  Large Adult   Resp: 18    Weight: (!) 144 kg (317 lb 9.6 oz)    Height: 180.3 cm (71\")       Body mass index is 44.3 kg/m².    Physical Exam  Vital Signs  The patient's weight is 317 pounds. Blood pressure is 145/89.      Physical Exam  Vitals and nursing note reviewed.   Constitutional:       Appearance: Normal appearance.   HENT:      Head: Normocephalic and atraumatic.      Nose: Nose normal.   Eyes:      Extraocular Movements: Extraocular movements intact.      Pupils: Pupils are equal, round, and reactive to light.   Cardiovascular:      Rate and Rhythm: Normal rate and regular rhythm.   Pulmonary:      Effort: Pulmonary effort is normal.      Breath sounds: Normal breath sounds.   Abdominal:      General: Abdomen is flat.      Palpations: Abdomen is soft.   Musculoskeletal:      Cervical back: Normal range of motion and neck supple.   Skin:     General: Skin is warm and dry.   Neurological:      General: No focal deficit present.      Mental Status: He is alert and oriented to person, place, and time.   Psychiatric:         Mood and Affect: Mood normal.         Behavior: Behavior normal.          RESULTS REVIEW  Lab Results   Component Value Date "    PROBNP 501.2 10/27/2024     CMP          9/13/2024    08:58 10/16/2024    10:22 10/27/2024    10:46   CMP   Glucose 101  104  115    BUN 31  21  15    Creatinine 1.53  1.19  1.02    EGFR 52.7  71.2  85.7    Sodium 134  133  138    Potassium 4.4  4.4  4.2    Chloride 100  101  101    Calcium 9.0  9.4  9.3    Total Protein 7.0   7.6    Albumin 4.1   3.7    Globulin 2.9   3.9    Total Bilirubin 0.5   0.5    Alkaline Phosphatase 72   79    AST (SGOT) 25   17    ALT (SGPT) 24   19    Albumin/Globulin Ratio 1.4   0.9    BUN/Creatinine Ratio 20.3  17.6  14.7    Anion Gap 11.6  8.9  9.9      CBC w/diff          6/3/2024    08:35 9/13/2024    08:58 10/27/2024    10:46   CBC w/Diff   WBC 7.30  6.52  9.28    RBC 6.26  5.47  4.81    Hemoglobin 17.4  15.3  13.6    Hematocrit 52.6  45.0  40.8    MCV 84.0  82.3  84.8    MCH 27.8  28.0  28.3    MCHC 33.1  34.0  33.3    RDW 13.8  13.8  14.1    Platelets 274  218  249    Neutrophil Rel % 67.2  64.6  64.4    Immature Granulocyte Rel % 0.4  0.3  0.9    Lymphocyte Rel % 22.2  23.6  17.9    Monocyte Rel % 6.6  8.3  13.3    Eosinophil Rel % 2.6  2.1  2.7    Basophil Rel % 1.0  1.1  0.8       Lipid Panel          6/3/2024    08:35   Lipid Panel   Total Cholesterol 141    Triglycerides 79    HDL Cholesterol 36    VLDL Cholesterol 15    LDL Cholesterol  90    LDL/HDL Ratio 2.48       Lab Results   Component Value Date    TSH 3.050 09/13/2024    TSH 2.090 03/07/2024      Lab Results   Component Value Date    FREET4 1.07 09/13/2024    FREET4 1.01 03/07/2024         Lab Results   Component Value Date    NUMWEESI20 739 06/03/2024    BWTC22JE 44.1 09/13/2024    MG 2.1 09/13/2024     PSA          6/3/2024    08:35 9/13/2024    08:58   PSA   PSA 0.864  0.963       No Images in the past 120 days found..        Results  Imaging  CAT scan of the chest showed no suspicious findings.    Testing  Lung function test indicated moderate COPD.             ASSESSMENT & PLAN  Diagnoses and all orders for  this visit:    1. Routine history and physical examination of adult (Primary)  Assessment & Plan:  Ages 40 to 64 Counseling/Anticipatory Guidance Discussed: nutrition, physical activity, healthy weight, injury prevention, misuse of tobacco, alcohol and drugs, dental health, mental health, immunizations, screenings, and use of seatbelts.    Orders:  -     Comprehensive Metabolic Panel; Future  -     Lipid Panel; Future  -     CBC & Differential; Future  -     Vitamin B12; Future  -     Folate; Future  -     Vitamin D,25-Hydroxy; Future  -     Magnesium; Future  -     Microalbumin / Creatinine Urine Ratio - Urine, Clean Catch; Future  -     Hemoglobin A1c; Future  -     TSH+Free T4; Future  -     T3, Free; Future    2. Centrilobular emphysema  -      CT Chest Low Dose Cancer Screening WO; Future  -     Comprehensive Metabolic Panel; Future  -     Lipid Panel; Future  -     CBC & Differential; Future  -     Vitamin B12; Future  -     Folate; Future  -     Vitamin D,25-Hydroxy; Future  -     Magnesium; Future  -     Microalbumin / Creatinine Urine Ratio - Urine, Clean Catch; Future  -     Hemoglobin A1c; Future  -     TSH+Free T4; Future  -     T3, Free; Future  -     Fluticasone-Umeclidin-Vilant (TRELEGY) 100-62.5-25 MCG/ACT inhaler; Inhale 1 puff Daily.  Dispense: 28 each; Refill: 5  -     Fluticasone-Umeclidin-Vilant (Trelegy Ellipta) 100-62.5-25 MCG/ACT inhaler; Inhale 1 puff Daily.  Dispense: 14 each; Refill: 0    3. Primary hypertension  Assessment & Plan:  Hypertension-better controlled -BP =128/82    Plan -Cont with  lisinopril 40 mg 1 daily  amlodipine 5 mg 1 daily and Maxide 75-50 mg 1/2  tablet daily  Follow a low-salt diet-  Cut back on portions  Goal blood pressure is less than 130/80 continue to monitor at home      Orders:  -     amLODIPine (NORVASC) 2.5 MG tablet; Take 2.5 mg if SBP >150 only one daily as needed-continue with amlodipine 5 mg q d  Dispense: 30 tablet; Refill: 1  -     Comprehensive  Metabolic Panel; Future  -     Lipid Panel; Future  -     CBC & Differential; Future  -     Vitamin B12; Future  -     Folate; Future  -     Vitamin D,25-Hydroxy; Future  -     Magnesium; Future  -     Microalbumin / Creatinine Urine Ratio - Urine, Clean Catch; Future  -     Hemoglobin A1c; Future  -     TSH+Free T4; Future  -     T3, Free; Future    4. Pure hypercholesterolemia  Assessment & Plan:  Chol--much improved with LDL down to 90 from 155 HDL 36, total cholesterol 141-drawn Noreen 3 compared to March 7 , 2024    plan-continue with atorvastatin 20 mg daily -some myalgias  Goal is LDL less than 130 (ideally less than 70)  Recheck lipids today       Orders:  -     Comprehensive Metabolic Panel; Future  -     Lipid Panel; Future  -     CBC & Differential; Future  -     Vitamin B12; Future  -     Folate; Future  -     Vitamin D,25-Hydroxy; Future  -     Magnesium; Future  -     Microalbumin / Creatinine Urine Ratio - Urine, Clean Catch; Future  -     Hemoglobin A1c; Future  -     TSH+Free T4; Future  -     T3, Free; Future    5. Reactive depression  -     Comprehensive Metabolic Panel; Future  -     Lipid Panel; Future  -     CBC & Differential; Future  -     Vitamin B12; Future  -     Folate; Future  -     Vitamin D,25-Hydroxy; Future  -     Magnesium; Future  -     Microalbumin / Creatinine Urine Ratio - Urine, Clean Catch; Future  -     Hemoglobin A1c; Future  -     TSH+Free T4; Future  -     T3, Free; Future    6. Class 3 severe obesity due to excess calories with serious comorbidity and body mass index (BMI) of 40.0 to 44.9 in adult    7. Low testosterone in male  Overview:  Has been on long-term testosterone replacement therapy for years was seen initially by Dr. Motnes last year until he retired.  Advised patient that I can refer him to [facility] in [place].  Patient prefers to find one in [facility]- will refer to [facility].    Orders:  -     Ambulatory Referral to Urology  -     Comprehensive  Metabolic Panel; Future  -     Lipid Panel; Future  -     CBC & Differential; Future  -     Vitamin B12; Future  -     Folate; Future  -     Vitamin D,25-Hydroxy; Future  -     Magnesium; Future  -     Microalbumin / Creatinine Urine Ratio - Urine, Clean Catch; Future  -     Hemoglobin A1c; Future  -     TSH+Free T4; Future  -     T3, Free; Future    8. Tobacco dependence in remission  -      CT Chest Low Dose Cancer Screening WO; Future  -     Comprehensive Metabolic Panel; Future  -     Lipid Panel; Future  -     CBC & Differential; Future  -     Vitamin B12; Future  -     Folate; Future  -     Vitamin D,25-Hydroxy; Future  -     Magnesium; Future  -     Microalbumin / Creatinine Urine Ratio - Urine, Clean Catch; Future  -     Hemoglobin A1c; Future  -     TSH+Free T4; Future  -     T3, Free; Future  -     Fluticasone-Umeclidin-Vilant (TRELEGY) 100-62.5-25 MCG/ACT inhaler; Inhale 1 puff Daily.  Dispense: 28 each; Refill: 5    9. Primary osteoarthritis of both knees  -     Comprehensive Metabolic Panel; Future  -     Lipid Panel; Future  -     CBC & Differential; Future  -     Vitamin B12; Future  -     Folate; Future  -     Vitamin D,25-Hydroxy; Future  -     Magnesium; Future  -     Microalbumin / Creatinine Urine Ratio - Urine, Clean Catch; Future  -     Hemoglobin A1c; Future  -     TSH+Free T4; Future  -     T3, Free; Future    10. Screening for diabetes mellitus  -     Comprehensive Metabolic Panel; Future  -     Lipid Panel; Future  -     CBC & Differential; Future  -     Vitamin B12; Future  -     Folate; Future  -     Vitamin D,25-Hydroxy; Future  -     Magnesium; Future  -     Microalbumin / Creatinine Urine Ratio - Urine, Clean Catch; Future  -     Hemoglobin A1c; Future  -     TSH+Free T4; Future  -     T3, Free; Future    11. Screening for lung cancer  Comments:  due for CT chest-neg in 2024-           Assessment & Plan  1. Chronic Obstructive Pulmonary Disease (COPD).  He reports worsening breathing  difficulties and uses an inhaler with two puffs in the morning and a rescue inhaler (albuterol) two to three times a day. A CAT scan from last year showed no suspicious findings, but another scan is due this year. A sample of Trelegy inhaler will be provided to see if it helps with his breathing. If effective, a prescription will be sent; otherwise, he will revert to his current inhaler. A CAT scan of the chest will be scheduled.    2. Hypertension.  His blood pressure readings have been inconsistent, with occasional spikes when medication is missed. He is currently on amlodipine 5 mg, lisinopril 40 mg, and Maxzide 75-50 mg 1/2 tab q d.. He is advised to continue monitoring his blood pressure at home. An additional dose of amlodipine 2.5 mg will be prescribed for use as needed if systolic blood pressure exceeds 150 mmHg.    3. Hyperlipidemia.  He reports experiencing muscle cramps once a day, which he manages by stretching. His LDL levels have shown improvement. A lipid panel will be ordered today. He is advised to continue his current medication regimen, including Lipitor 20 mg and CoQ10 100 mg.    4. Low Testosterone.  He has been on testosterone replacement therapy for years but needs a new provider due to his previous doctor's longterm. A referral to a urologist in [facility] will be initiated for the management of his low testosterone levels.    5. Depression.  He is currently on Lexapro 10 mg for depression, which appears to be helping his mood. No changes to his current treatment plan are necessary.    6. Sleep Apnea.  He reports symptoms suggestive of sleep apnea, including snoring and feeling fatigued during the day. A sleep study will be recommended to evaluate his condition further.-however pt wants to wait at this time regarding referral    7. Class 3 severe obesity due to excess calories with serious comorbidity and BM 40-44.9 in adult  He has lost 10 pounds since January but remains overweight, which  contributes to his breathing issues and knee pain. He is advised to continue his efforts in weight loss, including cutting back on food portions and maintaining a food journal.    8. Health Maintenance.  He will receive the Shingrix vaccine today, with the second dose scheduled for his next visit. A comprehensive blood workup will be ordered today to assess his overall health status.    Follow-up  The patient will follow up in 3 months.      Class 3 Severe Obesity (BMI >=40). Obesity-related health conditions include the following: hypertension and osteoarthritis. Obesity is improving with lifestyle modifications. BMI is is above average; BMI management plan is completed. We discussed low calorie, low carb based diet program, portion control, and increasing exercise.       Patient Instructions   Do labs today  Extra amlodipine 2.5 mg if systolic blood pressure greater than 150  We will try to refer to urology in [facility] for the low testosterone replacement therapy-  Goal blood pressure is less than 140/80  Needs shingrix vaccine and next one at next visit  Trelegy inhaler  Wants to hold off on sleep med consult  Needs Ct chest   Pt wants to lose >10 lbs by next visit-  Try keeping food journal-pt not eating in evenings     Patient or patient representative verbalized consent for the use of Ambient Listening during the visit with  Karen Rios MD for chart documentation. 4/16/2025  09:08 EDT    FOLLOW UP  Return in about 3 months (around 7/16/2025). Monitor weight/BP-    Patient was given instructions and counseling regarding his condition or for health maintenance advice. Please see specific information pulled into the AVS if appropriate.

## 2025-04-16 NOTE — ASSESSMENT & PLAN NOTE
Hypertension-better controlled -BP =128/82    Plan -Cont with  lisinopril 40 mg 1 daily  amlodipine 5 mg 1 daily and Maxide 75-50 mg 1/2  tablet daily  Follow a low-salt diet-  Cut back on portions  Goal blood pressure is less than 130/80 continue to monitor at home

## 2025-04-16 NOTE — PATIENT INSTRUCTIONS
Do labs today  Extra amlodipine 2.5 mg if systolic blood pressure greater than 150  We will try to refer to urology in [facility] for the low testosterone replacement therapy-  Goal blood pressure is less than 140/80  Needs shingrix vaccine and next one at next visit  Trelegy inhaler  Wants to hold off on sleep med consult  Needs Ct chest   Pt wants to lose >10 lbs by next visit-  Try keeping food journal-pt not eating in evenings

## 2025-04-16 NOTE — ASSESSMENT & PLAN NOTE
Chol--much improved with LDL down to 90 from 155 HDL 36, total cholesterol 141-drawn Noreen 3 compared to March 7 , 2024    plan-continue with atorvastatin 20 mg daily -some myalgias  Goal is LDL less than 130 (ideally less than 70)  Recheck lipids today

## 2025-04-18 ENCOUNTER — TELEPHONE (OUTPATIENT)
Dept: ORTHOPEDIC SURGERY | Facility: CLINIC | Age: 60
End: 2025-04-18
Payer: COMMERCIAL

## 2025-04-18 NOTE — TELEPHONE ENCOUNTER
HUB OK TO RELAY MSG/APPT (4-18-25)  CALLED AND LVM TO THE PT REGARDING APPT.    APPT: [place] EUFLEXXA INJ WITH RAFFY AT [facility]  6-3 AT 8:15 AM  6-10 AT 8:15 AM  6-17 AT 8:15 AM    LAST [place] K[place]EE STEROID INJ: 4-8  LAST [place] EUFLEXXA INJ: 11-25    [employer] =NO PA REQ

## 2025-04-18 NOTE — TELEPHONE ENCOUNTER
----- Message from Erika FRENCH sent at 4/17/2025  9:17 AM EDT -----  NO PA Required for bilateral knee Euflexxa.  Okay to schedule when appropriate.

## 2025-04-29 ENCOUNTER — HOSPITAL ENCOUNTER (OUTPATIENT)
Dept: CT IMAGING | Facility: HOSPITAL | Age: 60
Discharge: HOME | End: 2025-04-29
Admitting: INTERNAL MEDICINE
Payer: COMMERCIAL

## 2025-04-29 DIAGNOSIS — F17.201 TOBACCO DEPENDENCE IN REMISSION: ICD-10-CM

## 2025-04-29 DIAGNOSIS — J43.2 CENTRILOBULAR EMPHYSEMA: ICD-10-CM

## 2025-04-29 PROCEDURE — 71271 CT THORAX LUNG CANCER SCR C-: CPT

## 2025-06-02 ENCOUNTER — TELEPHONE (OUTPATIENT)
Dept: ORTHOPEDIC SURGERY | Facility: CLINIC | Age: 60
End: 2025-06-02
Payer: COMMERCIAL

## 2025-06-02 NOTE — TELEPHONE ENCOUNTER
EMERITAM LETTING PT KNOW SINCE HIS INS HAS NEW MEMBER ID WE NEED TO RESUBMIT FOR AUTH FOR EUFLEXXA INJ AND THAT I HAVE CANCELLED APPT FOR TOMORROW AND IF WE GET AUTH BY 6/10 WE CAN START 1ST EUFLEXXA THIS DATE. OK FOR HUB TO RELAY. KLEBER WORKING ON AUTH.

## 2025-06-03 ENCOUNTER — OFFICE VISIT (OUTPATIENT)
Dept: ORTHOPEDIC SURGERY | Facility: CLINIC | Age: 60
End: 2025-06-03
Payer: COMMERCIAL

## 2025-06-03 VITALS
HEIGHT: 71 IN | SYSTOLIC BLOOD PRESSURE: 139 MMHG | HEART RATE: 76 BPM | WEIGHT: 313 LBS | OXYGEN SATURATION: 92 % | DIASTOLIC BLOOD PRESSURE: 82 MMHG | BODY MASS INDEX: 43.82 KG/M2

## 2025-06-03 DIAGNOSIS — M17.0 BILATERAL PRIMARY OSTEOARTHRITIS OF KNEE: Primary | ICD-10-CM

## 2025-06-03 NOTE — PROGRESS NOTES
"Chief Complaint  Follow-up of the Right Knee and Follow-up of the Left Knee    Subjective          History of Present Illness      Kevin Jackson is a 60 y.o. male  presents to [facility] for     Patient presents for follow-up evaluation of bilateral knee pain and bilateral knee osteoarthritis.  He is here to receive Euflexxa injection #1.  He states his right knee is worse than the left.  He denies new injury or symptoms of pain but states pain is in the anterior medial and lateral knee as in the past.  He has had steroid injections and Euflexxa injections in the past with relief.  He would like to continue conservative treatment      No Known Allergies     Social History     Socioeconomic History    Marital status:    Tobacco Use    Smoking status: Former     Current packs/day: 0.00     Average packs/day: 0.5 packs/day for 19.9 years (10.0 ttl pk-yrs)     Types: Cigarettes     Start date: 4/1/1995     Quit date: 3/1/2015     Years since quitting: 10.2     Passive exposure: Past    Smokeless tobacco: Never   Vaping Use    Vaping status: Never Used   Substance and Sexual Activity    Alcohol use: Not Currently    Drug use: Never    Sexual activity: Yes     Partners: Female        REVIEW OF SYSTEMS    Constitutional: Awake alert and oriented x3, no acute distress, denies fevers, chills, weight loss  Respiratory: No respiratory distress  Vascular: Brisk cap refill, Intact distal pulses, No cyanosis, compartments soft with no signs or symptoms of compartment syndrome or DVT.   Cardiovascular: Denies chest pain, shortness of breath  Skin: Denies rashes, acute skin changes  Neurologic: Denies headache, loss of consciousness  MSK: Bilateral knee pain      Objective   Vital Signs:   /82   Pulse 76   Ht 180.3 cm (70.98\")   Wt (!) 142 kg (313 lb)   SpO2 92%   BMI 43.67 kg/m²     Body mass index is 43.67 kg/m².    Physical Exam       Right knee-skin is intact, no erythema, no " ecchymosis or swelling, no effusion or signs of infection, non-tender.  Extension -3, flexion 120 degrees. Stable to varus/valgus stress. Stable to anterior/posterior drawer. Negative Lachman's. Negative Canelo's. Positive crepitus.      Left knee- skin is intact, no erythema, no ecchymosis or swelling, no effusion or signs of infection, non-tender.  Full extension, flexion 120 degrees. Stable to varus/valgus stress. Stable to anterior/posterior drawer. Negative Lachman's. Negative Canelo's. Positive crepitus.       Large Joint: R knee  Date/Time: 6/3/2025 8:42 AM  Consent given by: patient  Site marked: site marked  Timeout: Immediately prior to procedure a time out was called to verify the correct patient, procedure, equipment, support staff and site/side marked as required   Supporting Documentation  Indications: pain   Procedure Details  Location: knee - R knee  Preparation: Patient was prepped and draped in the usual sterile fashion  Needle gauge: 21g.  Medications administered: 20 mg Sodium Hyaluronate (Viscosup) 20 MG/2ML  Patient tolerance: patient tolerated the procedure well with no immediate complications      Large Joint: L knee  Date/Time: 6/3/2025 8:42 AM  Consent given by: patient  Site marked: site marked  Timeout: Immediately prior to procedure a time out was called to verify the correct patient, procedure, equipment, support staff and site/side marked as required   Supporting Documentation  Indications: pain   Procedure Details  Location: knee - L knee  Preparation: Patient was prepped and draped in the usual sterile fashion  Needle gauge: 21g.  Medications administered: 20 mg Sodium Hyaluronate (Viscosup) 20 MG/2ML  Patient tolerance: patient tolerated the procedure well with no immediate complications    This injection documentation was Scribed for BRO Banks by Kristina Hess MA.  06/03/25   08:43 EDT    Imaging Results (Most Recent)       None                    Assessment and Plan    Diagnoses and all orders for this visit:    1. Bilateral primary osteoarthritis of knee (Primary)        Discussed diagnosis and treatment options with the patient he elected to have bilateral knee Euflexxa injection #1.  A sterile prep of the injection site was performed with chloraprep. Cryospray was used for local anesthesia. The site was injected. The patient tolerated the procedure well without complications. Post injection pain was discussed.    The risks, benefits, complications, treatment options/alternatives, and expected outcomes/goals were discussed with the patient.   Follow-up in 1 week for Euflexxa injection #2    Call or return if worsening symptoms.    Follow Up   Return in about 1 week (around 6/10/2025).  Patient was given instructions and counseling regarding his condition or for health maintenance advice. Please see specific information pulled into the AVS if appropriate.       EMR Dragon/Transcription disclaimer:  Part of this note may be an electronic transcription/translation of spoken language to printed text using the Dragon Dictation System

## 2025-06-10 ENCOUNTER — OFFICE VISIT (OUTPATIENT)
Dept: ORTHOPEDIC SURGERY | Facility: CLINIC | Age: 60
End: 2025-06-10
Payer: COMMERCIAL

## 2025-06-10 VITALS
OXYGEN SATURATION: 92 % | WEIGHT: 313 LBS | SYSTOLIC BLOOD PRESSURE: 137 MMHG | DIASTOLIC BLOOD PRESSURE: 67 MMHG | HEART RATE: 92 BPM | BODY MASS INDEX: 43.82 KG/M2 | HEIGHT: 71 IN

## 2025-06-10 DIAGNOSIS — M17.0 BILATERAL PRIMARY OSTEOARTHRITIS OF KNEE: Primary | ICD-10-CM

## 2025-06-10 NOTE — PROGRESS NOTES
"Chief Complaint  Follow-up of the Right Knee and Follow-up of the Left Knee    Subjective          History of Present Illness      Kevin Jackson is a 60 y.o. male     History of Present Illness  Patient presents for follow-up evaluation of bilateral knee pain and bilateral knee osteoarthritis and to receive Euflexxa injection #2 for bilateral knees.  He states the first injection he found to be helpful it did not take away all his pain but he does feel a difference.        No Known Allergies     Social History     Socioeconomic History    Marital status:    Tobacco Use    Smoking status: Former     Current packs/day: 0.00     Average packs/day: 0.5 packs/day for 19.9 years (10.0 ttl pk-yrs)     Types: Cigarettes     Start date: 4/1/1995     Quit date: 3/1/2015     Years since quitting: 10.2     Passive exposure: Past    Smokeless tobacco: Never   Vaping Use    Vaping status: Never Used   Substance and Sexual Activity    Alcohol use: Not Currently    Drug use: Never    Sexual activity: Yes     Partners: Female        REVIEW OF SYSTEMS    Constitutional: Awake alert and oriented x3, no acute distress, denies fevers, chills, weight loss  Respiratory: No respiratory distress  Vascular: Brisk cap refill, Intact distal pulses, No cyanosis, compartments soft with no signs or symptoms of compartment syndrome or DVT.   Cardiovascular: Denies chest pain, shortness of breath  Skin: Denies rashes, acute skin changes  Neurologic: Denies headache, loss of consciousness  MSK: Bilateral knee pain      Objective   Vital Signs:   /67   Pulse 92   Ht 180.3 cm (70.98\")   Wt (!) 142 kg (313 lb)   SpO2 92%   BMI 43.67 kg/m²     Body mass index is 43.67 kg/m².         Physical Exam  Right knee-skin is intact, no erythema, no ecchymosis or swelling, no effusion or signs of infection, non-tender.  Extension -3, flexion 120 degrees. Stable to varus/valgus stress. Stable to anterior/posterior drawer. Negative Lachman's. " Negative Canelo's. Positive crepitus.      Left knee- skin is intact, no erythema, no ecchymosis or swelling, no effusion or signs of infection, non-tender.  Full extension, flexion 120 degrees. Stable to varus/valgus stress. Stable to anterior/posterior drawer. Negative Lachman's. Negative Canelo's. Positive crepitus.         Large Joint: R knee  Date/Time: 6/10/2025 8:24 AM  Consent given by: patient  Site marked: site marked  Timeout: Immediately prior to procedure a time out was called to verify the correct patient, procedure, equipment, support staff and site/side marked as required   Supporting Documentation  Indications: pain   Procedure Details  Location: knee - R knee  Preparation: Patient was prepped and draped in the usual sterile fashion  Needle gauge: 21g.  Medications administered: 20 mg Sodium Hyaluronate (Viscosup) 20 MG/2ML  Patient tolerance: patient tolerated the procedure well with no immediate complications      Large Joint: L knee  Date/Time: 6/10/2025 8:24 AM  Consent given by: patient  Site marked: site marked  Timeout: Immediately prior to procedure a time out was called to verify the correct patient, procedure, equipment, support staff and site/side marked as required   Supporting Documentation  Indications: pain   Procedure Details  Location: knee - L knee  Preparation: Patient was prepped and draped in the usual sterile fashion  Needle gauge: 21g.  Medications administered: 20 mg Sodium Hyaluronate (Viscosup) 20 MG/2ML  Patient tolerance: patient tolerated the procedure well with no immediate complications      This injection documentation was Scribed for BRO Banks by Kristina Hess MA.  06/10/25   08:24 EDT    Imaging Results (Most Recent)       None                     Assessment and Plan    Diagnoses and all orders for this visit:    1. Bilateral primary osteoarthritis of knee (Primary)  -     Large Joint: R knee  -     Large Joint: L knee        Assessment &  Plan  Discussed diagnosis and treatment options with the patient he elected to have bilateral knee Euflexxa injection #2.  A sterile prep of the injection site was performed with chloraprep. Cryospray was used for local anesthesia. The site was injected. The patient tolerated the procedure well without complications. Post injection pain was discussed.    The risks, benefits, complications, treatment options/alternatives, and expected outcomes/goals were discussed with the patient.   Follow-up 1 week for Euflexxa injection #3      Call or return if worsening symptoms.    Follow Up   Return in about 1 week (around 6/17/2025).  Patient was given instructions and counseling regarding his condition or for health maintenance advice. Please see specific information pulled into the AVS if appropriate.       Contains text text generated by [employer]  EMR Dragon/Transcription disclaimer:  Part of this note may be an electronic transcription/translation of spoken language to printed text using the Dragon Dictation System

## 2025-06-17 ENCOUNTER — OFFICE VISIT (OUTPATIENT)
Dept: ORTHOPEDIC SURGERY | Facility: CLINIC | Age: 60
End: 2025-06-17
Payer: COMMERCIAL

## 2025-06-17 VITALS
OXYGEN SATURATION: 95 % | DIASTOLIC BLOOD PRESSURE: 79 MMHG | HEIGHT: 71 IN | HEART RATE: 80 BPM | SYSTOLIC BLOOD PRESSURE: 121 MMHG | BODY MASS INDEX: 43.82 KG/M2 | WEIGHT: 313 LBS

## 2025-06-17 DIAGNOSIS — M17.0 BILATERAL PRIMARY OSTEOARTHRITIS OF KNEE: Primary | ICD-10-CM

## 2025-06-17 NOTE — PROGRESS NOTES
"Chief Complaint  Follow-up of the Right Knee and Follow-up of the Left Knee    Subjective          History of Present Illness      Kevin Jackson is a 60 y.o. male  presents to [facility] for     Patient presents for follow-up evaluation of bilateral knee pain bilateral knee osteoarthritis and to receive Euflexxa injection #3.  He states he still has pain with going up and down stairs or up or down a hill but states that he does feel that the pain is less than it was before.      No Known Allergies     Social History     Socioeconomic History    Marital status:    Tobacco Use    Smoking status: Former     Current packs/day: 0.00     Average packs/day: 0.5 packs/day for 19.9 years (10.0 ttl pk-yrs)     Types: Cigarettes     Start date: 4/1/1995     Quit date: 3/1/2015     Years since quitting: 10.3     Passive exposure: Past    Smokeless tobacco: Never   Vaping Use    Vaping status: Never Used   Substance and Sexual Activity    Alcohol use: Not Currently    Drug use: Never    Sexual activity: Yes     Partners: Female        REVIEW OF SYSTEMS    Constitutional: Awake alert and oriented x3, no acute distress, denies fevers, chills, weight loss  Respiratory: No respiratory distress  Vascular: Brisk cap refill, Intact distal pulses, No cyanosis, compartments soft with no signs or symptoms of compartment syndrome or DVT.   Cardiovascular: Denies chest pain, shortness of breath  Skin: Denies rashes, acute skin changes  Neurologic: Denies headache, loss of consciousness  MSK: Bilateral knee pain      Objective   Vital Signs:   /79   Pulse 80   Ht 180.3 cm (70.98\")   Wt (!) 142 kg (313 lb)   SpO2 95%   BMI 43.67 kg/m²     Body mass index is 43.67 kg/m².    Physical Exam       Right knee-skin is intact, no erythema, no ecchymosis or swelling, no effusion or signs of infection, non-tender.  Extension -3, flexion 120 degrees. Stable to varus/valgus stress. Stable to " anterior/posterior drawer. Negative Lachman's. Negative Canelo's. Positive crepitus.      Left knee- skin is intact, no erythema, no ecchymosis or swelling, no effusion or signs of infection, non-tender.  Full extension, flexion 120 degrees. Stable to varus/valgus stress. Stable to anterior/posterior drawer. Negative Lachman's. Negative Canelo's. Positive crepitus.       Large Joint: R knee  Date/Time: 6/17/2025 8:43 AM  Consent given by: patient  Site marked: site marked  Timeout: Immediately prior to procedure a time out was called to verify the correct patient, procedure, equipment, support staff and site/side marked as required   Supporting Documentation  Indications: pain   Procedure Details  Location: knee - R knee  Preparation: Patient was prepped and draped in the usual sterile fashion  Needle gauge: 21g.  Medications administered: 20 mg Sodium Hyaluronate (Viscosup) 20 MG/2ML  Patient tolerance: patient tolerated the procedure well with no immediate complications      Large Joint: L knee  Date/Time: 6/17/2025 8:43 AM  Consent given by: patient  Site marked: site marked  Timeout: Immediately prior to procedure a time out was called to verify the correct patient, procedure, equipment, support staff and site/side marked as required   Supporting Documentation  Indications: pain   Procedure Details  Location: knee - L knee  Preparation: Patient was prepped and draped in the usual sterile fashion  Needle gauge: 21g.  Medications administered: 20 mg Sodium Hyaluronate (Viscosup) 20 MG/2ML  Patient tolerance: patient tolerated the procedure well with no immediate complications      This injection documentation was Scribed for BRO Banks by Kristina Hess MA.  06/17/25   08:44 EDT    Imaging Results (Most Recent)       None                   Assessment and Plan    Diagnoses and all orders for this visit:    1. Bilateral primary osteoarthritis of knee (Primary)  -     Large Joint: R knee  -      Large Joint: L knee        Discussed diagnosis and treatment options with the patient he elected to have bilateral knee Euflexxa injection #3.  A sterile prep of the injection site was performed with chloraprep. Cryospray was used for local anesthesia. The site was injected. The patient tolerated the procedure well without complications. Post injection pain was discussed.    The risks, benefits, complications, treatment options/alternatives, and expected outcomes/goals were discussed with the patient.   Follow-up in 3 months.    Call or return if worsening symptoms.    Follow Up   Return in about 3 months (around 9/17/2025) for Recheck.  Patient was given instructions and counseling regarding his condition or for health maintenance advice. Please see specific information pulled into the AVS if appropriate.       EMR Dragon/Transcription disclaimer:  Part of this note may be an electronic transcription/translation of spoken language to printed text using the Dragon Dictation System

## 2025-07-16 ENCOUNTER — OFFICE VISIT (OUTPATIENT)
Dept: INTERNAL MEDICINE | Age: 60
End: 2025-07-16
Payer: COMMERCIAL

## 2025-07-16 VITALS
TEMPERATURE: 97.8 F | OXYGEN SATURATION: 95 % | BODY MASS INDEX: 44.1 KG/M2 | WEIGHT: 315 LBS | SYSTOLIC BLOOD PRESSURE: 120 MMHG | HEART RATE: 73 BPM | HEIGHT: 71 IN | DIASTOLIC BLOOD PRESSURE: 78 MMHG

## 2025-07-16 DIAGNOSIS — R06.02 SHORTNESS OF BREATH: ICD-10-CM

## 2025-07-16 DIAGNOSIS — J43.2 CENTRILOBULAR EMPHYSEMA: ICD-10-CM

## 2025-07-16 DIAGNOSIS — E66.813 CLASS 3 SEVERE OBESITY DUE TO EXCESS CALORIES WITH SERIOUS COMORBIDITY AND BODY MASS INDEX (BMI) OF 40.0 TO 44.9 IN ADULT: ICD-10-CM

## 2025-07-16 DIAGNOSIS — I10 PRIMARY HYPERTENSION: Primary | ICD-10-CM

## 2025-07-16 DIAGNOSIS — R73.03 PREDIABETES: ICD-10-CM

## 2025-07-16 DIAGNOSIS — R79.89 LOW TESTOSTERONE IN MALE: ICD-10-CM

## 2025-07-16 DIAGNOSIS — G47.30 SLEEP APNEA, UNSPECIFIED TYPE: ICD-10-CM

## 2025-07-16 DIAGNOSIS — E66.01 MORBID OBESITY DUE TO EXCESS CALORIES: ICD-10-CM

## 2025-07-16 DIAGNOSIS — E78.00 PURE HYPERCHOLESTEROLEMIA: ICD-10-CM

## 2025-07-16 DIAGNOSIS — F17.201 TOBACCO DEPENDENCE IN REMISSION: ICD-10-CM

## 2025-07-16 DIAGNOSIS — Z23 NEED FOR VACCINATION: ICD-10-CM

## 2025-07-16 DIAGNOSIS — F32.9 REACTIVE DEPRESSION: ICD-10-CM

## 2025-07-16 RX ORDER — METFORMIN HYDROCHLORIDE 500 MG/1
500 TABLET, EXTENDED RELEASE ORAL
Qty: 90 TABLET | Refills: 1 | Status: SHIPPED | OUTPATIENT
Start: 2025-07-16

## 2025-07-16 RX ORDER — ROSUVASTATIN CALCIUM 20 MG/1
20 TABLET, COATED ORAL DAILY
Qty: 90 TABLET | Refills: 1 | Status: SHIPPED | OUTPATIENT
Start: 2025-07-16

## 2025-07-16 RX ORDER — AMLODIPINE BESYLATE 5 MG/1
5 TABLET ORAL DAILY
Qty: 90 TABLET | Refills: 3 | Status: SHIPPED | OUTPATIENT
Start: 2025-07-16

## 2025-07-16 RX ORDER — ALBUTEROL SULFATE 90 UG/1
INHALANT RESPIRATORY (INHALATION)
Qty: 54 G | Refills: 1 | Status: SHIPPED | OUTPATIENT
Start: 2025-07-16

## 2025-07-16 RX ORDER — LISINOPRIL 40 MG/1
40 TABLET ORAL DAILY
Qty: 90 TABLET | Refills: 3 | Status: SHIPPED | OUTPATIENT
Start: 2025-07-16

## 2025-07-16 RX ORDER — TRIAMTERENE AND HYDROCHLOROTHIAZIDE 75; 50 MG/1; MG/1
0.5 TABLET ORAL DAILY
Qty: 45 TABLET | Refills: 3 | Status: SHIPPED | OUTPATIENT
Start: 2025-07-16

## 2025-07-16 RX ORDER — ESCITALOPRAM OXALATE 10 MG/1
10 TABLET ORAL DAILY
Qty: 90 TABLET | Refills: 3 | Status: SHIPPED | OUTPATIENT
Start: 2025-07-16

## 2025-07-16 NOTE — ASSESSMENT & PLAN NOTE
Hypertension-stable    Plan -Cont with  lisinopril 40 mg 1 daily  amlodipine 5 mg 1 daily and Maxide 75-50 mg 1/2  tablet daily  Follow a low-salt diet-  Cut back on portions  Goal blood pressure is less than 130/80 continue to monitor at home

## 2025-07-16 NOTE — PATIENT INSTRUCTIONS
To rosuvastatin/Crestor 20 mg once a day goal LDL is less than 70    Stop  atorvastatin    Refer to sleep medicine to check for sleep apnea    Start metformin  mg once a day for prediabetes to help decrease the risk of developing diabetes-defer any diarrhea nausea    Consider GLP-1 agonist like Mounjaro in future if A1c increases    Refer to Pulm clinic    Patient received Pneumovax and Shingrix vaccines today

## 2025-08-07 ENCOUNTER — OFFICE VISIT (OUTPATIENT)
Dept: PULMONOLOGY | Facility: CLINIC | Age: 60
End: 2025-08-07

## 2025-08-07 ENCOUNTER — HOSPITAL ENCOUNTER (OUTPATIENT)
Facility: HOSPITAL | Age: 60
Discharge: HOME | End: 2025-08-07
Admitting: INTERNAL MEDICINE
Payer: COMMERCIAL

## 2025-08-07 VITALS
HEIGHT: 71 IN | RESPIRATION RATE: 16 BRPM | OXYGEN SATURATION: 93 % | BODY MASS INDEX: 44.1 KG/M2 | HEART RATE: 84 BPM | SYSTOLIC BLOOD PRESSURE: 134 MMHG | DIASTOLIC BLOOD PRESSURE: 82 MMHG | TEMPERATURE: 98.2 F | WEIGHT: 315 LBS

## 2025-08-07 DIAGNOSIS — Z23 NEED FOR VACCINATION: ICD-10-CM

## 2025-08-07 DIAGNOSIS — J43.2 CENTRILOBULAR EMPHYSEMA: ICD-10-CM

## 2025-08-07 DIAGNOSIS — F17.201 TOBACCO DEPENDENCE IN REMISSION: Primary | ICD-10-CM

## 2025-08-07 DIAGNOSIS — G47.30 SLEEP APNEA, UNSPECIFIED TYPE: ICD-10-CM

## 2025-08-07 DIAGNOSIS — F32.9 REACTIVE DEPRESSION: ICD-10-CM

## 2025-08-07 DIAGNOSIS — F17.201 TOBACCO DEPENDENCE IN REMISSION: ICD-10-CM

## 2025-08-07 DIAGNOSIS — R06.02 SHORTNESS OF BREATH: ICD-10-CM

## 2025-08-07 DIAGNOSIS — G47.33 OSA (OBSTRUCTIVE SLEEP APNEA): ICD-10-CM

## 2025-08-07 DIAGNOSIS — E66.813 CLASS 3 SEVERE OBESITY DUE TO EXCESS CALORIES WITH SERIOUS COMORBIDITY AND BODY MASS INDEX (BMI) OF 40.0 TO 44.9 IN ADULT: ICD-10-CM

## 2025-08-07 DIAGNOSIS — I10 PRIMARY HYPERTENSION: ICD-10-CM

## 2025-08-07 DIAGNOSIS — E78.00 PURE HYPERCHOLESTEROLEMIA: ICD-10-CM

## 2025-08-07 DIAGNOSIS — R79.89 LOW TESTOSTERONE IN MALE: ICD-10-CM

## 2025-08-07 DIAGNOSIS — E66.01 MORBID OBESITY DUE TO EXCESS CALORIES: ICD-10-CM

## 2025-08-07 DIAGNOSIS — J44.89 ASTHMA-COPD OVERLAP SYNDROME: ICD-10-CM

## 2025-08-07 DIAGNOSIS — R05.9 COUGH, UNSPECIFIED TYPE: ICD-10-CM

## 2025-08-07 DIAGNOSIS — R06.00 DYSPNEA, UNSPECIFIED TYPE: ICD-10-CM

## 2025-08-07 DIAGNOSIS — E66.813 OBESITY, CLASS III, BMI 40-49.9 (MORBID OBESITY): ICD-10-CM

## 2025-08-07 LAB — EXHALED NITROUS OXIDE: 16

## 2025-08-07 PROCEDURE — 95012 NITRIC OXIDE EXP GAS DETER: CPT | Performed by: NURSE PRACTITIONER

## 2025-08-07 PROCEDURE — 93306 TTE W/DOPPLER COMPLETE: CPT

## 2025-08-07 RX ORDER — ALBUTEROL SULFATE 0.83 MG/ML
2.5 SOLUTION RESPIRATORY (INHALATION) EVERY 4 HOURS PRN
COMMUNITY

## 2025-08-08 LAB
AORTIC DIMENSIONLESS INDEX: 0.84 (DI)
ASCENDING AORTA: 3.5 CM
AV MEAN PRESS GRAD SYS DOP V1V2: 6.9 MMHG
AV VMAX SYS DOP: 172 CM/SEC
BH CV ECHO MEAS - AO MAX PG: 11.9 MMHG
BH CV ECHO MEAS - AO ROOT DIAM: 2.8 CM
BH CV ECHO MEAS - AO V2 VTI: 33 CM
BH CV ECHO MEAS - AVA(I,D): 2.9 CM2
BH CV ECHO MEAS - EDV(MOD-SP2): 137.4 ML
BH CV ECHO MEAS - EDV(MOD-SP4): 125.9 ML
BH CV ECHO MEAS - EF(MOD-SP2): 64.5 %
BH CV ECHO MEAS - EF(MOD-SP4): 60.3 %
BH CV ECHO MEAS - ESV(MOD-SP2): 48.8 ML
BH CV ECHO MEAS - ESV(MOD-SP4): 50 ML
BH CV ECHO MEAS - IVS/LVPW: 1.07 CM
BH CV ECHO MEAS - IVSD: 1.6 CM
BH CV ECHO MEAS - LA DIMENSION: 5.1 CM
BH CV ECHO MEAS - LAT PEAK E' VEL: 11.2 CM/SEC
BH CV ECHO MEAS - LV DIASTOLIC VOL/BSA (35-75): 49.6 CM2
BH CV ECHO MEAS - LV MAX PG: 7.8 MMHG
BH CV ECHO MEAS - LV MEAN PG: 4 MMHG
BH CV ECHO MEAS - LV SYSTOLIC VOL/BSA (12-30): 19.7 CM2
BH CV ECHO MEAS - LV V1 MAX: 140 CM/SEC
BH CV ECHO MEAS - LV V1 VTI: 27.6 CM
BH CV ECHO MEAS - LVIDD: 5.1 CM
BH CV ECHO MEAS - LVIDS: 2.9 CM
BH CV ECHO MEAS - LVOT AREA: 3.5 CM2
BH CV ECHO MEAS - LVOT DIAM: 2.1 CM
BH CV ECHO MEAS - LVPWD: 1.5 CM
BH CV ECHO MEAS - MED PEAK E' VEL: 9.9 CM/SEC
BH CV ECHO MEAS - MR MAX PG: 43.6 MMHG
BH CV ECHO MEAS - MR MAX VEL: 329.9 CM/SEC
BH CV ECHO MEAS - MV A MAX VEL: 82.3 CM/SEC
BH CV ECHO MEAS - MV DEC SLOPE: 426 CM/SEC2
BH CV ECHO MEAS - MV E MAX VEL: 91 CM/SEC
BH CV ECHO MEAS - MV E/A: 1.11
BH CV ECHO MEAS - MV MAX PG: 3.3 MMHG
BH CV ECHO MEAS - MV MEAN PG: 1.6 MMHG
BH CV ECHO MEAS - MV P1/2T: 61 MSEC
BH CV ECHO MEAS - MV V2 VTI: 24.2 CM
BH CV ECHO MEAS - MVA(P1/2T): 3.6 CM2
BH CV ECHO MEAS - MVA(VTI): 3.9 CM2
BH CV ECHO MEAS - RAP SYSTOLE: 8 MMHG
BH CV ECHO MEAS - RVDD: 3.2 CM
BH CV ECHO MEAS - RVSP: 37 MMHG
BH CV ECHO MEAS - SV(LVOT): 95.5 ML
BH CV ECHO MEAS - SV(MOD-SP2): 88.6 ML
BH CV ECHO MEAS - SV(MOD-SP4): 75.9 ML
BH CV ECHO MEAS - SVI(LVOT): 37.6 ML/M2
BH CV ECHO MEAS - SVI(MOD-SP2): 34.9 ML/M2
BH CV ECHO MEAS - SVI(MOD-SP4): 29.9 ML/M2
BH CV ECHO MEAS - TR MAX PG: 29.4 MMHG
BH CV ECHO MEAS - TR MAX VEL: 270.8 CM/SEC
BH CV ECHO MEASUREMENTS AVERAGE E/E' RATIO: 8.63
BH CV XLRA - TDI S': 22.6 CM/SEC
LEFT ATRIUM VOLUME INDEX: 34.3 ML/M2
LV EF BIPLANE MOD: 62.5 %